# Patient Record
Sex: FEMALE | Race: WHITE | NOT HISPANIC OR LATINO | Employment: FULL TIME | URBAN - METROPOLITAN AREA
[De-identification: names, ages, dates, MRNs, and addresses within clinical notes are randomized per-mention and may not be internally consistent; named-entity substitution may affect disease eponyms.]

---

## 2018-01-15 ENCOUNTER — OFFICE VISIT (OUTPATIENT)
Dept: MEDICAL GROUP | Facility: PHYSICIAN GROUP | Age: 58
End: 2018-01-15
Payer: COMMERCIAL

## 2018-01-15 VITALS
BODY MASS INDEX: 50.22 KG/M2 | WEIGHT: 266 LBS | TEMPERATURE: 99.1 F | HEART RATE: 76 BPM | OXYGEN SATURATION: 95 % | RESPIRATION RATE: 16 BRPM | HEIGHT: 61 IN | DIASTOLIC BLOOD PRESSURE: 80 MMHG | SYSTOLIC BLOOD PRESSURE: 110 MMHG

## 2018-01-15 DIAGNOSIS — J45.990 EXERCISE-INDUCED ASTHMA: ICD-10-CM

## 2018-01-15 DIAGNOSIS — Z12.39 BREAST CANCER SCREENING: ICD-10-CM

## 2018-01-15 DIAGNOSIS — Z00.00 ROUTINE ADULT HEALTH MAINTENANCE: ICD-10-CM

## 2018-01-15 DIAGNOSIS — I10 ESSENTIAL HYPERTENSION: ICD-10-CM

## 2018-01-15 DIAGNOSIS — E28.2 PCO (POLYCYSTIC OVARIES): ICD-10-CM

## 2018-01-15 DIAGNOSIS — E66.9 OBESITY (BMI 30-39.9): ICD-10-CM

## 2018-01-15 PROCEDURE — 99204 OFFICE O/P NEW MOD 45 MIN: CPT | Performed by: INTERNAL MEDICINE

## 2018-01-15 RX ORDER — METFORMIN HYDROCHLORIDE 500 MG/1
500 TABLET, EXTENDED RELEASE ORAL DAILY
Qty: 90 TAB | Refills: 3 | Status: SHIPPED | OUTPATIENT
Start: 2018-01-15 | End: 2018-04-09

## 2018-01-15 RX ORDER — HYDROCHLOROTHIAZIDE 25 MG/1
1 TABLET ORAL DAILY
Refills: 5 | COMMUNITY
Start: 2017-12-19 | End: 2018-01-15 | Stop reason: SDUPTHER

## 2018-01-15 RX ORDER — ALBUTEROL SULFATE 90 UG/1
AEROSOL, METERED RESPIRATORY (INHALATION)
Refills: 0 | COMMUNITY
Start: 2017-12-19 | End: 2019-07-19

## 2018-01-15 RX ORDER — HYDROCHLOROTHIAZIDE 25 MG/1
25 TABLET ORAL DAILY
Qty: 30 TAB | Refills: 5 | Status: SHIPPED | OUTPATIENT
Start: 2018-01-15 | End: 2018-06-04

## 2018-01-15 RX ORDER — METFORMIN HYDROCHLORIDE 500 MG/1
500 TABLET, EXTENDED RELEASE ORAL DAILY
COMMUNITY
End: 2018-01-15 | Stop reason: SDUPTHER

## 2018-01-15 ASSESSMENT — PATIENT HEALTH QUESTIONNAIRE - PHQ9: CLINICAL INTERPRETATION OF PHQ2 SCORE: 0

## 2018-01-15 NOTE — PROGRESS NOTES
PRIMARY CARE CLINIC NEW PATIENT H&P  Chief Complaint   Patient presents with   • Medication Refill     History of Present Illness     PCO (polycystic ovaries)  Has been on metformin since 2000. Was being followed by her endocrinologist Dr. Mcnair but was discharged from endocrinology after she had her hysterectomy and didn't think she needed to follow with that provider. When she tried to come off of metformin she was feeling tired and was resumed at a lower dose (was on 2000 mg daily with Dr. Mcnair). Metformin at 500 mg daily is working fine.     Hypertension  Started HCTZ recently.     Obesity (BMI 30-39.9)  She isn't exercising currently but is planning to return to physical activity. Her  does most of the cooking.     Current Outpatient Prescriptions   Medication Sig Dispense Refill   • VENTOLIN  (90 Base) MCG/ACT Aero Soln inhalation aerosol INHALE 1 TO 2 PUFFS BY MOUTH EVERY 4 TO 6 HOURS AS NEEDED FOR DIFFICULTY BREATHING  0   • hydrochlorothiazide (HYDRODIURIL) 25 MG Tab Take 1 Tab by mouth every day. 30 Tab 5   • metformin ER (GLUCOPHAGE XR) 500 MG TABLET SR 24 HR Take 1 Tab by mouth every day. 90 Tab 3     No current facility-administered medications for this visit.        Past Medical History:   Diagnosis Date   • Exercise-induced asthma 1/15/2018   • Hypertension 1/15/2018   • PCO (polycystic ovaries) 1996     Past Surgical History:   Procedure Laterality Date   • CHOLECYSTECTOMY  2012   • CATARACT EXTRACTION WITH IOL Bilateral 1999   • TONSILLECTOMY  1989   • TUBAL COAGULATION LAPAROSCOPIC BILATERAL  1985   • ABDOMINAL HYSTERECTOMY TOTAL       Social History   Substance Use Topics   • Smoking status: Never Smoker   • Smokeless tobacco: Never Used   • Alcohol use Yes      Comment: rare (holidays)      Social History     Social History Narrative          Family History   Problem Relation Age of Onset   • Other Mother      smoker   • Obesity Father      Family Status  "  Relation Status   • Mother Alive   • Father      Allergies: Epinephrine and Penicillins    ROS  Constitutional: Negative for fatigue/generalized weakness.   HEENT: Negative for  vision changes, hearing changes    Respiratory: Negative for shortness of breath  Cardiovascular: Negative for chest pain, palpitations  Gastrointestinal: Negative for blood in stool, constipation, diarrhea  Genitourinary: Negative for dysuria, polyuria  Musculoskeletal: Negative for myalgias, back pain, and joint pain.   Skin: Negative for rash  Neurological: Negative for numbness, tingling  Psychiatric/Behavioral: Negative for depression, anxiety      Objective   Blood pressure 110/80, pulse 76, temperature 37.3 °C (99.1 °F), resp. rate 16, height 1.549 m (5' 1\"), weight 120.7 kg (266 lb), SpO2 95 %, not currently breastfeeding. Body mass index is 50.26 kg/m².    General: Alert, oriented. In no acute distress   HEET: EOMI, PERRL, conjunctiva non-injected, sclera non-icteric.  Nares patent with no significant congestion or drainage.  Heidi pinnae, external auditory canals, TM pearly gray with normal light reflex bilaterally.Oral mucous membranes pink and moist with no lesions.  Neck: supple with no cervical, subclavicular lymphadenopathy, JVD, palpable thyroid nodules   Lungs: clear to auscultation bilaterally with good excursion.  CV: regular rate and rhythm.  Abdomen soft, non-distended, non-tender with normal bowel sounds. No hepatosplenomegaly, no masses palpated  Skin: no lesions. Warm, dry   Psychiatric: appropriate mood and affect     Assessment and Plan   The following treatment plan was discussed     1. Essential hypertension  Currently at goal on HCTZ 25 mg daily, blood pressure today 110/80. Continue current dose.   - hydrochlorothiazide (HYDRODIURIL) 25 MG Tab; Take 1 Tab by mouth every day.  Dispense: 30 Tab; Refill: 5    2. PCO (polycystic ovaries)  Was followed by Dr. Mcnari in endocrinology previously however " hasn't followed up with her since her hysterectomy. Referral to Dr. Mcnair to continue management of PCOS.   - metformin ER (GLUCOPHAGE XR) 500 MG TABLET SR 24 HR; Take 1 Tab by mouth every day.  Dispense: 90 Tab; Refill: 3  - REFERRAL TO ENDOCRINOLOGY    3. Obesity (BMI 30-39.9)  Counseled on weight loss. Discussed HIP programs and patient agreeable, referral placed.   - Patient identified as having weight management issue.  Appropriate orders and counseling given.  - REFERRAL TO [facility] HEALTH IMPROVEMENT PROGRAMS (HIP) Services Requested: [facility] Staff to Evaluate Best Program; Reason for Visit: Overweight/Obesity    4. Breast cancer screening  Due for annual screening mammogram, referral placed.   - [id number]; Future    5. Exercise-induced asthma  Requires intermittent use of albuterol inhaler.     6. Routine adult health maintenance  Will obtain fasting labs and communicate results via [facility].   - COMP METABOLIC PANEL; Future  - CBC WITH DIFFERENTIAL; Future  - VITAMIN D,25 HYDROXY; Future  - LIPID PROFILE; Future    Return in about 6 months (around 7/15/2018).    Health Maintenance      Health Maintenance Due   Topic Date Due   • PAP SMEAR  09/15/1982   • MAMMOGRAM  09/15/2001   • COLONOSCOPY  09/15/2011     Pap smear and colonoscopy records requested     Eric Low MD  Internal Medicine  [facility]

## 2018-01-15 NOTE — ASSESSMENT & PLAN NOTE
Has been on metformin since 2000. Was being followed by her endocrinologist Dr. Mcnair but was discharged from endocrinology after she had her hysterectomy and didn't think she needed to follow with that provider. When she tried to come off of metformin she was feeling tired and was resumed at a lower dose (was on 2000 mg daily with Dr. Mcnair). Metformin at 500 mg daily is working fine.

## 2018-01-15 NOTE — LETTER
[facility]  Eric Low M.D.  [address]  [facility] [address]  Fax: [phone]   Authorization for Release/Disclosure of   Protected Health Information   Name: ALEX MEZA : 1960 SSN: [id number]   Address: [address] [place] [address] Phone:    [phone] (home)    I authorize the entity listed below to release/disclose the PHI below to:   [facility]/Eric Low M.D. and Eric Low M.D.   Provider or Entity Name:  [facility]   Address   [facility] [place]            [address] [place] [address] Phone:  [phone]      Fax:       [phone]        Reason for request: continuity of care   Information to be released:    [ X ] LAST COLONOSCOPY,  including any PATH REPORT and follow-up  [ X ] LAST FIT/COLOGUARD RESULT [  ] LAST DEXA  [  ] LAST MAMMOGRAM  [  ] LAST PAP  [  ] LAST LABS [  ] RETINA EXAM REPORT  [  ] IMMUNIZATION RECORDS  [  ] Release all info      [  ] Check here and initial the line next to each item to release ALL health information INCLUDING  _____ Care and treatment for drug and / or alcohol abuse  _____ HIV testing, infection status, or AIDS  _____ Genetic Testing    DATES OF SERVICE OR TIME PERIOD TO BE DISCLOSED: _____________  I understand and acknowledge that:  * This Authorization may be revoked at any time by you in writing, except if your health information has already been used or disclosed.  * Your health information that will be used or disclosed as a result of you signing this authorization could be re-disclosed by the recipient. If this occurs, your re-disclosed health information may no longer be protected by State or Federal laws.  * You may refuse to sign this Authorization. Your refusal will not affect your ability to obtain treatment.  * This Authorization becomes effective upon signing and will  on (date) __________.      If no date is indicated, this Authorization will  one (1) year from the signature date.     Name: Jojo Gil    Signature: continuity of care    Date:     1/15/2018       PLEASE FAX REQUESTED RECORDS BACK TO: [phone]

## 2018-01-15 NOTE — PATIENT INSTRUCTIONS
· Please have your fasting blood work done at your convenience  · You have been given a referral to start seeing your endocrinologist Dr. Mcnair again

## 2018-01-15 NOTE — ASSESSMENT & PLAN NOTE
She isn't exercising currently but is planning to return to physical activity. Her  does most of the cooking.

## 2018-01-15 NOTE — LETTER
[facility]  Eric Low M.D.  [address]  [facility] [address]  Fax: [phone]   Authorization for Release/Disclosure of   Protected Health Information   Name: JOJO MEZA : 1960 SSN: [id number]   Address: [address] [place] [address] Phone:    [phone] (home)    I authorize the entity listed below to release/disclose the PHI below to:   [facility]/Eric Low M.D. and Eric Low M.D.   Provider or Entity Name:  Dr. Mo Lorenzo     Address   City, State, Zip   Phone:      [phone]   Reason for request: continuity of care   Information to be released:    [  ] LAST COLONOSCOPY,  including any PATH REPORT and follow-up  [  ] LAST FIT/COLOGUARD RESULT [  ] LAST DEXA  [  ] LAST MAMMOGRAM  [X] LAST PAP  [  ] LAST LABS [  ] RETINA EXAM REPORT  [  ] IMMUNIZATION RECORDS  [  ] Release all info      [  ] Check here and initial the line next to each item to release ALL health information INCLUDING  _____ Care and treatment for drug and / or alcohol abuse  _____ HIV testing, infection status, or AIDS  _____ Genetic Testing    DATES OF SERVICE OR TIME PERIOD TO BE DISCLOSED: _____________  I understand and acknowledge that:  * This Authorization may be revoked at any time by you in writing, except if your health information has already been used or disclosed.  * Your health information that will be used or disclosed as a result of you signing this authorization could be re-disclosed by the recipient. If this occurs, your re-disclosed health information may no longer be protected by State or Federal laws.  * You may refuse to sign this Authorization. Your refusal will not affect your ability to obtain treatment.  * This Authorization becomes effective upon signing and will  on (date) __________.      If no date is indicated, this Authorization will  one (1) year from the signature date.    Name: Jojo Meza    Signature: continuity of care    Date:          1/15/2018       PLEASE FAX REQUESTED RECORDS BACK TO: [phone]

## 2018-01-15 NOTE — LETTER
[facility]  Eric Low M.D.  [address]  [facility] [address]  Fax: [phone]   Authorization for Release/Disclosure of   Protected Health Information   Name: JOJO MEZA : 1960 SSN: [id number]   Address: [address] [place] [address] Phone:    [phone] (home)    I authorize the entity listed below to release/disclose the PHI below to:   [facility]/Eric Low M.D. and Eric Low M.D.   Provider or Entity Name:  [facility]   Address   City, State, Zip   Phone:      Fax:     Reason for request: continuity of care   Information to be released:    [  ] LAST COLONOSCOPY,  including any PATH REPORT and follow-up  [  ] LAST FIT/COLOGUARD RESULT [  ] LAST DEXA  [X] LAST MAMMOGRAM  [  ] LAST PAP  [  ] LAST LABS [  ] RETINA EXAM REPORT  [  ] IMMUNIZATION RECORDS  [  ] Release all info      [  ] Check here and initial the line next to each item to release ALL health information INCLUDING  _____ Care and treatment for drug and / or alcohol abuse  _____ HIV testing, infection status, or AIDS  _____ Genetic Testing    DATES OF SERVICE OR TIME PERIOD TO BE DISCLOSED: _____________  I understand and acknowledge that:  * This Authorization may be revoked at any time by you in writing, except if your health information has already been used or disclosed.  * Your health information that will be used or disclosed as a result of you signing this authorization could be re-disclosed by the recipient. If this occurs, your re-disclosed health information may no longer be protected by State or Federal laws.  * You may refuse to sign this Authorization. Your refusal will not affect your ability to obtain treatment.  * This Authorization becomes effective upon signing and will  on (date) __________.      If no date is indicated, this Authorization will  one (1) year from the signature date.    Name: Jojo Meza    Signature: continuity of care    Date:     1/15/2018       PLEASE FAX  REQUESTED RECORDS BACK TO: [phone]

## 2018-01-24 ENCOUNTER — TELEPHONE (OUTPATIENT)
Dept: MEDICAL GROUP | Facility: PHYSICIAN GROUP | Age: 58
End: 2018-01-24

## 2018-01-24 DIAGNOSIS — E66.9 OBESITY (BMI 30-39.9): ICD-10-CM

## 2018-01-24 NOTE — TELEPHONE ENCOUNTER
2. SPECIFIC Action To Be Taken: Referral pending, please sign.    3. Diagnosis/Clinical Reason for Request: Over weight/ Obesity   4. Specialty & Provider Name/Lab/Imaging Location: Dr. Villanueva      5. Is appointment scheduled for requested order/referral: no    Patient informed they will receive a return phone call from the office ONLY if there are any questions before processing their request. Advised to call back if they haven't received a call from the referral department in 5 days.

## 2018-01-26 ENCOUNTER — OFFICE VISIT (OUTPATIENT)
Dept: URGENT CARE | Facility: PHYSICIAN GROUP | Age: 58
End: 2018-01-26
Payer: COMMERCIAL

## 2018-01-26 VITALS
RESPIRATION RATE: 20 BRPM | TEMPERATURE: 97.6 F | HEIGHT: 61 IN | OXYGEN SATURATION: 94 % | HEART RATE: 91 BPM | SYSTOLIC BLOOD PRESSURE: 132 MMHG | WEIGHT: 266 LBS | BODY MASS INDEX: 50.22 KG/M2 | DIASTOLIC BLOOD PRESSURE: 84 MMHG

## 2018-01-26 DIAGNOSIS — Z87.09 HISTORY OF ASTHMA: ICD-10-CM

## 2018-01-26 DIAGNOSIS — J30.9 CHRONIC ALLERGIC RHINITIS, UNSPECIFIED SEASONALITY, UNSPECIFIED TRIGGER: ICD-10-CM

## 2018-01-26 DIAGNOSIS — J01.90 ACUTE RHINOSINUSITIS: ICD-10-CM

## 2018-01-26 PROCEDURE — 99214 OFFICE O/P EST MOD 30 MIN: CPT | Performed by: EMERGENCY MEDICINE

## 2018-01-26 RX ORDER — DOXYCYCLINE HYCLATE 100 MG
100 TABLET ORAL 2 TIMES DAILY
Qty: 14 TAB | Refills: 0 | Status: SHIPPED | OUTPATIENT
Start: 2018-01-26 | End: 2018-04-09

## 2018-01-26 ASSESSMENT — ENCOUNTER SYMPTOMS
SPUTUM PRODUCTION: 0
COUGH: 1
HEADACHES: 1
ABDOMINAL PAIN: 0
NAUSEA: 0
SHORTNESS OF BREATH: 0
SORE THROAT: 1
VOMITING: 0
SINUS PRESSURE: 1
DIARRHEA: 0
WHEEZING: 0

## 2018-01-26 NOTE — PROGRESS NOTES
Subjective:      Jojo Gil is a 57 y.o. female who presents with Sinus Problem (with cough, runny nose, fever up to 100.6 x 1 week fever is now going away)            Sinus Problem   This is a recurrent problem. The current episode started in the past 7 days. The problem is unchanged. Maximum temperature: initially, resolved. The pain is moderate. Associated symptoms include congestion, coughing, ear pain, headaches, sinus pressure and a sore throat. Pertinent negatives include no shortness of breath. Past treatments include oral decongestants and saline sprays. The treatment provided no relief.   Past medical history of recurrent sinusitis.    Review of Systems   Constitutional: Positive for malaise/fatigue.   HENT: Positive for congestion, ear pain, sinus pressure and sore throat. Negative for ear discharge, hearing loss and nosebleeds.    Respiratory: Positive for cough. Negative for sputum production, shortness of breath and wheezing.    Cardiovascular: Negative for chest pain.   Gastrointestinal: Negative for abdominal pain, diarrhea, nausea and vomiting.   Skin: Negative for rash.   Neurological: Positive for headaches.   Endo/Heme/Allergies: Positive for environmental allergies.       PMH:  has a past medical history of Exercise-induced asthma (1/15/2018); Hypertension (1/15/2018); and PCO (polycystic ovaries) (1996).  MEDS:   Current Outpatient Prescriptions:   •  doxycycline (VIBRAMYCIN) 100 MG Tab, Take 1 Tab by mouth 2 times a day., Disp: 14 Tab, Rfl: 0  •  hydrochlorothiazide (HYDRODIURIL) 25 MG Tab, Take 1 Tab by mouth every day., Disp: 30 Tab, Rfl: 5  •  metformin ER (GLUCOPHAGE XR) 500 MG TABLET SR 24 HR, Take 1 Tab by mouth every day., Disp: 90 Tab, Rfl: 3  •  VENTOLIN  (90 Base) MCG/ACT Aero Soln inhalation aerosol, INHALE 1 TO 2 PUFFS BY MOUTH EVERY 4 TO 6 HOURS AS NEEDED FOR DIFFICULTY BREATHING, Disp: , Rfl: 0  ALLERGIES:   Allergies   Allergen Reactions   • Epinephrine      Highly  "sensitive    • Penicillins      SURGHX:   Past Surgical History:   Procedure Laterality Date   • CHOLECYSTECTOMY  2012   • CATARACT EXTRACTION WITH IOL Bilateral 1999   • TONSILLECTOMY  1989   • TUBAL COAGULATION LAPAROSCOPIC BILATERAL  1985   • ABDOMINAL HYSTERECTOMY TOTAL       SOCHX:  reports that she has never smoked. She has never used smokeless tobacco. She reports that she drinks alcohol. She reports that she does not use drugs.  FH: family history includes Obesity in her father; Other in her mother.     Objective:     /84   Pulse 91   Temp 36.4 °C (97.6 °F)   Resp 20   Ht 1.549 m (5' 1\")   Wt 120.7 kg (266 lb)   SpO2 94%   BMI 50.26 kg/m²      Physical Exam   Constitutional: She appears well-developed and well-nourished. She is cooperative. She does not appear ill. No distress.   HENT:   Right Ear: Tympanic membrane and ear canal normal.   Left Ear: Tympanic membrane and ear canal normal.   Nose: Mucosal edema and rhinorrhea present. Right sinus exhibits maxillary sinus tenderness and frontal sinus tenderness. Left sinus exhibits maxillary sinus tenderness and frontal sinus tenderness.   Mouth/Throat: Uvula is midline and oropharynx is clear and moist.   Eyes: Conjunctivae are normal.   Neck: Trachea normal. Neck supple.   Cardiovascular: Normal rate, regular rhythm and normal heart sounds.    Pulmonary/Chest: Effort normal. She has no decreased breath sounds. She has no wheezes. She has no rhonchi. She has no rales.   Lymphadenopathy:     She has no cervical adenopathy.   Neurological: She is alert.   Skin: Skin is warm and dry.   Psychiatric: She has a normal mood and affect.               Assessment/Plan:     1. Acute rhinosinusitis  Wait-and-see antibiotic based on prior history and duration:  - doxycycline (VIBRAMYCIN) 100 MG Tab; Take 1 Tab by mouth 2 times a day.  Dispense: 14 Tab; Refill: 0  Recommended supportive care measures, including rest, increasing oral fluid intake and use of " over-the-counter medications for relief of symptoms.  2. Chronic allergic rhinitis, unspecified seasonality, unspecified trigger  Recommended nasal saline irrigation daily, OTC inhaled nasal steroid daily.  3. History of asthma  Own ISABELA prn

## 2018-02-01 ENCOUNTER — TELEPHONE (OUTPATIENT)
Dept: MEDICAL GROUP | Facility: PHYSICIAN GROUP | Age: 58
End: 2018-02-01

## 2018-02-01 DIAGNOSIS — E66.9 OBESITY (BMI 30-39.9): ICD-10-CM

## 2018-02-13 ENCOUNTER — HOSPITAL ENCOUNTER (OUTPATIENT)
Dept: LAB | Facility: MEDICAL CENTER | Age: 58
End: 2018-02-13
Attending: INTERNAL MEDICINE
Payer: COMMERCIAL

## 2018-02-13 DIAGNOSIS — Z00.00 ROUTINE ADULT HEALTH MAINTENANCE: ICD-10-CM

## 2018-02-13 LAB
25(OH)D3 SERPL-MCNC: 19 NG/ML (ref 30–100)
BASOPHILS # BLD AUTO: 1.4 % (ref 0–1.8)
BASOPHILS # BLD: 0.09 K/UL (ref 0–0.12)
EOSINOPHIL # BLD AUTO: 0.17 K/UL (ref 0–0.51)
EOSINOPHIL NFR BLD: 2.6 % (ref 0–6.9)
ERYTHROCYTE [DISTWIDTH] IN BLOOD BY AUTOMATED COUNT: 44.6 FL (ref 35.9–50)
HCT VFR BLD AUTO: 44.8 % (ref 37–47)
HGB BLD-MCNC: 14.6 G/DL (ref 12–16)
IMM GRANULOCYTES # BLD AUTO: 0.02 K/UL (ref 0–0.11)
IMM GRANULOCYTES NFR BLD AUTO: 0.3 % (ref 0–0.9)
LYMPHOCYTES # BLD AUTO: 2.07 K/UL (ref 1–4.8)
LYMPHOCYTES NFR BLD: 32.1 % (ref 22–41)
MCH RBC QN AUTO: 28.3 PG (ref 27–33)
MCHC RBC AUTO-ENTMCNC: 32.6 G/DL (ref 33.6–35)
MCV RBC AUTO: 87 FL (ref 81.4–97.8)
MONOCYTES # BLD AUTO: 0.36 K/UL (ref 0–0.85)
MONOCYTES NFR BLD AUTO: 5.6 % (ref 0–13.4)
NEUTROPHILS # BLD AUTO: 3.74 K/UL (ref 2–7.15)
NEUTROPHILS NFR BLD: 58 % (ref 44–72)
NRBC # BLD AUTO: 0 K/UL
NRBC BLD-RTO: 0 /100 WBC
PLATELET # BLD AUTO: 289 K/UL (ref 164–446)
PMV BLD AUTO: 11.5 FL (ref 9–12.9)
RBC # BLD AUTO: 5.15 M/UL (ref 4.2–5.4)
WBC # BLD AUTO: 6.5 K/UL (ref 4.8–10.8)

## 2018-02-13 PROCEDURE — 80053 COMPREHEN METABOLIC PANEL: CPT

## 2018-02-13 PROCEDURE — 85025 COMPLETE CBC W/AUTO DIFF WBC: CPT

## 2018-02-13 PROCEDURE — 80061 LIPID PANEL: CPT

## 2018-02-13 PROCEDURE — 36415 COLL VENOUS BLD VENIPUNCTURE: CPT

## 2018-02-13 PROCEDURE — 82306 VITAMIN D 25 HYDROXY: CPT

## 2018-02-14 LAB
ALBUMIN SERPL BCP-MCNC: 3.6 G/DL (ref 3.2–4.9)
ALBUMIN/GLOB SERPL: 1 G/DL
ALP SERPL-CCNC: 44 U/L (ref 30–99)
ALT SERPL-CCNC: 22 U/L (ref 2–50)
ANION GAP SERPL CALC-SCNC: 8 MMOL/L (ref 0–11.9)
AST SERPL-CCNC: 17 U/L (ref 12–45)
BILIRUB SERPL-MCNC: 0.5 MG/DL (ref 0.1–1.5)
BUN SERPL-MCNC: 17 MG/DL (ref 8–22)
CALCIUM SERPL-MCNC: 9.1 MG/DL (ref 8.5–10.5)
CHLORIDE SERPL-SCNC: 102 MMOL/L (ref 96–112)
CHOLEST SERPL-MCNC: 181 MG/DL (ref 100–199)
CO2 SERPL-SCNC: 26 MMOL/L (ref 20–33)
CREAT SERPL-MCNC: 0.79 MG/DL (ref 0.5–1.4)
GLOBULIN SER CALC-MCNC: 3.5 G/DL (ref 1.9–3.5)
GLUCOSE SERPL-MCNC: 88 MG/DL (ref 65–99)
HDLC SERPL-MCNC: 47 MG/DL
LDLC SERPL CALC-MCNC: 95 MG/DL
POTASSIUM SERPL-SCNC: 4.1 MMOL/L (ref 3.6–5.5)
PROT SERPL-MCNC: 7.1 G/DL (ref 6–8.2)
SODIUM SERPL-SCNC: 136 MMOL/L (ref 135–145)
TRIGL SERPL-MCNC: 197 MG/DL (ref 0–149)

## 2018-02-27 ENCOUNTER — NON-PROVIDER VISIT (OUTPATIENT)
Dept: HEALTH INFORMATION MANAGEMENT | Facility: MEDICAL CENTER | Age: 58
End: 2018-02-27
Payer: COMMERCIAL

## 2018-02-27 VITALS
DIASTOLIC BLOOD PRESSURE: 78 MMHG | WEIGHT: 270.2 LBS | HEART RATE: 86 BPM | SYSTOLIC BLOOD PRESSURE: 126 MMHG | HEIGHT: 61 IN | BODY MASS INDEX: 51.01 KG/M2 | TEMPERATURE: 98 F | OXYGEN SATURATION: 95 %

## 2018-02-27 DIAGNOSIS — I10 ESSENTIAL HYPERTENSION: ICD-10-CM

## 2018-02-27 DIAGNOSIS — E66.9 OBESITY (BMI 30-39.9): ICD-10-CM

## 2018-02-27 DIAGNOSIS — E78.1 HYPERTRIGLYCERIDEMIA: ICD-10-CM

## 2018-02-27 DIAGNOSIS — R53.82 CHRONIC FATIGUE: ICD-10-CM

## 2018-02-27 DIAGNOSIS — E55.9 VITAMIN D DEFICIENCY: ICD-10-CM

## 2018-02-27 DIAGNOSIS — G47.9 SLEEPING DIFFICULTY: ICD-10-CM

## 2018-02-27 PROCEDURE — 99205 OFFICE O/P NEW HI 60 MIN: CPT | Performed by: INTERNAL MEDICINE

## 2018-02-27 PROCEDURE — 97802 MEDICAL NUTRITION INDIV IN: CPT | Performed by: DIETITIAN, REGISTERED

## 2018-02-27 NOTE — PROGRESS NOTES
"2/27/2018   Referring Provider: Eric Low M.D.       Time in/out: 4660 - 9917    Anthropometrics/Objective  Today's weight: 270.2#  Height: 61\"  BMI: 51.1   Stated Goal Weight: < 150#  See comprehensive patient history form for further information     Subjective:  Pt is here today for the initial screening visit for the medical weight management program.   -Eats a homemade protein shake with protein powder, 2% milk, oatmeal, peanut butter for breakfast  -Frozen meal for lunch ([employer] brand usually)  -Large dinner d/t hunger of protein, vegetables, and CHO  -Snacks on nuts or protein bar    See Medical Questionnaire for more detailed diet history     Nutrition Diagnosis (PES Statement)  · Morbid obesity related to excessive energy intake and inadequate energy expenditure as evidenced by BMI 51.1.     Biochemical data, medical test and procedures  Lab Results   Component Value Date/Time    CHOLSTRLTOT 181 02/13/2018 07:12 AM    LDL 95 02/13/2018 07:12 AM    HDL 47 02/13/2018 07:12 AM    TRIGLYCERIDE 197 (H) 02/13/2018 07:12 AM         Nutrition Intervention  Nutrition Prescription  Recommended Daily Kcals: 1800 Kcal based on REE    Recommended Daily Protein: > 100 grams, per Dr. Penny     Meal Plan Recommendation   Recommend 1 meal replacements with 2 grocery meals and 1-2 snacks  per day    Comprehensive Nutrition Education Instruction or training leading to in-depth nutrition related knowledge about:  Benefits to following meal plan, Combine carb, protein and fat at each meal, Meal timing and spacing, Menu Planning, Metabolism of carb, protein, fat, Physical activity/exercise, Portion control and Handouts provided regarding topics discussed     Monitoring & Evaluation Plan  Behavioral-Environmental:  Behavior: Keep a food journal and bring to next appointment   Physical activity: increase as toleratd    Food / Nutrient Intake:  Food intake: Follow meal plan as discussed (see above). Avoid concentrated " sweets and processed carbs.  Switch to unsweetened almond milk in smoothies instead of 2% milk.  Limit CHO at dinner to 1/2 cup  Fluid intake: Consume at least 64 oz water per day. Avoid all unsweetened beverages. Limit coffee to 1 cup a day (ideally no cream or sugar). Avoid alcohol.      Physical Signs / Symptoms:  Weight change:  Weight loss to goal      Assessment Notes:  Jojo puts a lot of the blame on her  because he cooks for her - she needs to learn that it is not her fault if she overeats.  She is willing to start tracking what she eats using My [employer]itness Pal and I believe that this will be an eye-opener for her.  She is likely getting > 50 grams CHO in her smoothie each morning, which would be an easy place to cut CHO from her diet.  She wants to try using unsweetened almond milk in her smoothie and will try to get her  on board.    At dinner she will eat protein and non-starchy vegetables and has agreed to limiting her CHO to 1/2 cup or less.  By getting her to start thinking of CHO as optional she will start to recognize that if she cannot limit her portion of a certain food she will need to get rid of it from her diet.      We will assess her food diary and will adjust as necessary at next appointment.      Follow up 2 weeks

## 2018-02-27 NOTE — PROGRESS NOTES
Bariatric Medicine H&P  Chief Complaint   Patient presents with   • Weight Gain       Referred by:  Eric Low M.D.    History of Present Illness:   Jojo Gil is a 57 y.o.  female who presents for weight management and to help address co-morbidities related to overweight, including hypertriglyceridemia, vitamin D deficiency, hypertension, fatigue.    The patient is frustrated by ongoing obesity, joint pains, fatigue. She started gaining a significant amount of weight 32 years ago after her first pregnancy, increased after second pregnancy, and she has been a unable to lose a significant amount since that time. She loses about 9-20 pounds, then stops losing. She has tried different diets in the past. Low calorie has been the most successful, but was not sustainable. She felt it was too restrictive. She has not used weight loss medications, is not interested in weight loss surgery.    The patient does not like to take medications, is not on vitamin D repletion. Her blood pressure is controlled with hydrochlorothiazide. She has been put back on metformin for fatigue, felt to be related to PCOS.    The patient does not track intake or calories. She states her portion sizes are too big. For morning, her  makes her an Orgain meal replacement shake with milk, oatmeal, peanut butter. For lunch, she has [employer] frozen entrées. For dinner she has several pieces of chicken or fish and vegetables, sometimes pasta. During the day, she has a Stabilyze protein bar (11 g of protein/21 g of carbs). She mostly drinks water, sometimes coffee. Weekends are little bit easier to manage and they usually have 2 meals at home per day. She notes she binges at times when she waits too long to eat.      Behavior-Related History:  Binge eating screen: Positive  After waiting too long to eat., lets her  cook and decide what the and how much     Exercise:   None. Likes yoga. Doesn't like to work out in the morning, has  "no time later in the day due to work; she also admits to joint aches and pains, feels too lethargic sometimes to exercise.    Life Style Changes:  Sole provider,  had stroke 5 years ago and stays at home     Review of Systems   Fatigue, lack of energy, intermittent shortness of breath. Incontinence with coughing. Snoring while asleep. Back and joint pain. Anxiety. Heartburn. Hirsutism. Heat and cold intolerance.  Sleep apnea screen: Positive, has not had a sleep study in the past  All other ROS were reviewed with patient today and are negative.      PMH/PSH:  I have reviewed the patient's medical, social and family history, allergies, and medications today.  Prior records reviewed.  FHx Obesity: Father with obesity  Personal Hx of Bariatric Surgery: None. Not interested.      Physical Exam:   /78   Pulse 86   Temp 36.7 °C (98 °F)   Ht 1.549 m (5' 1\")   Wt 122.6 kg (270 lb 3.2 oz)   SpO2 95%   BMI 51.05 kg/m²   Waist: 53 in  Body fat % 59  REE 1842 kcal/day    Constitutional: Oriented to person, place, and time and well-developed, well-nourished, and in no distress.    HENT: Mild facial plethora.  No Cushingoid features.  No scalloped tongue.  No dental erosions.  No swollen parotids.  Head: Normocephalic.   Eyes: EOM are normal. Pupils are equal, round, and reactive to light. No periorbital edema.  No lateral thinning of eyebrows.  No vertical nystagmus.  Neck: Normal range of motion. Neck supple. No thyromegaly present. No buffalo hump.  Cardiovascular: Normal rate and regular rhythm.  No murmur heard.  Pulmonary/Chest: Effort normal and breath sounds normal. No wheezes.   Abdominal: Soft. Bowel sounds are normal.  grade 1 pannus.  No ascites.  No hepatosplenomegaly.  No red striae.  Musculoskeletal: Normal range of motion. Trace pitting edema bilateral ankles.   Neurological: Alert and oriented to person, place, and time. Normal reflexes. No cranial nerve deficit. No muscle weakness.  Gait " normal.   Skin: Warm and dry. Not diaphoretic. No hirsuitism.  No acanthosis nigricans.  Not excessively dry, scaly.  No acne.  No bruising/ecchymosis.  No hyperpigmentation.  No xanthomas or acrochordon.  No violaceous striae.  No keratosis pilaris.  Psychiatric: Mood, memory, affect and judgment normal.     Laboratory:   Prior labs reviewed.  EKG: Recently done at [facility], patient to bring in next visit      Dietitian Assessment: I have reviewed the Dietitian's assessment related to this encounter.       ASSESSMENT/PLAN:  Body mass index is 51.05 kg/m².   Obesity Stage ([place]) 2; Class 3    1. Obesity (BMI 30-39.9)  REFERRAL TO PULMONOLOGY   2. Essential hypertension     3. Hypertriglyceridemia     4. Vitamin D deficiency     5. Chronic fatigue  REFERRAL TO PULMONOLOGY   6. Sleeping difficulty  REFERRAL TO PULMONOLOGY     The patient has significant comorbidities related to her obesity. She could benefit greatly from reducing her carbohydrate and total calorie intake daily, and needs to begin tracking. She is getting too many calories with the morning meal replacement she is using.  Plan will not be too restrictive.  Her hypertension, triglyceride level, fatigue will all improve with weight loss. Continue to assess the patient's readiness to change.     The patient and I have discussed at length and agree to the following recommendations, which are all addressing the above diagnoses:    Weight Goal: 5% wt loss at one month after start (pt goal weight is 150 lb)  Diet: Track intake with MyFitnessPal, keep total carbs less than 100 g per day, protein greater than 100 g per day  Discuss further with RD today  Physical Activity: Walking as tolerated  Risk level for moderate/vigorous exercise program: Moderate  New Rx: None. Patient may benefit from weight loss medications but does not appear ready to use them at this time.  Side Effects: Will review consent if applicable.  Behavior change: Continue to assess  patient's readiness to change  Follow-up: 2 weeks  Bring an EKG next visit    Face to face time spent 60 minutes,  with >50% of time devoted to one on one counseling on weight management issues, as documented above.      Thank you for your referral!

## 2018-02-27 NOTE — PATIENT INSTRUCTIONS
Bring in EKG next visit  Track intake with My Fitness Pal  Keep total carbs < 100 g per day and protein > 100 g per day  64+ oz water per day  Reduce intake at morning meal, to d/w RD

## 2018-03-08 ENCOUNTER — HOSPITAL ENCOUNTER (OUTPATIENT)
Dept: LAB | Facility: MEDICAL CENTER | Age: 58
End: 2018-03-08
Attending: INTERNAL MEDICINE
Payer: COMMERCIAL

## 2018-03-08 LAB — CORTIS SERPL-MCNC: 1 UG/DL (ref 0–23)

## 2018-03-08 PROCEDURE — 82024 ASSAY OF ACTH: CPT

## 2018-03-08 PROCEDURE — 82533 TOTAL CORTISOL: CPT

## 2018-03-08 PROCEDURE — 36415 COLL VENOUS BLD VENIPUNCTURE: CPT

## 2018-03-09 ENCOUNTER — HOSPITAL ENCOUNTER (OUTPATIENT)
Dept: LAB | Facility: MEDICAL CENTER | Age: 58
End: 2018-03-09
Attending: INTERNAL MEDICINE
Payer: COMMERCIAL

## 2018-03-09 LAB
EST. AVERAGE GLUCOSE BLD GHB EST-MCNC: 120 MG/DL
GLUCOSE SERPL-MCNC: 98 MG/DL (ref 65–99)
HBA1C MFR BLD: 5.8 % (ref 0–5.6)
T4 FREE SERPL-MCNC: 1.01 NG/DL (ref 0.53–1.43)
TSH SERPL DL<=0.005 MIU/L-ACNC: 2.01 UIU/ML (ref 0.38–5.33)

## 2018-03-09 PROCEDURE — 84305 ASSAY OF SOMATOMEDIN: CPT

## 2018-03-09 PROCEDURE — 82947 ASSAY GLUCOSE BLOOD QUANT: CPT

## 2018-03-09 PROCEDURE — 84439 ASSAY OF FREE THYROXINE: CPT

## 2018-03-09 PROCEDURE — 83525 ASSAY OF INSULIN: CPT

## 2018-03-09 PROCEDURE — 84443 ASSAY THYROID STIM HORMONE: CPT

## 2018-03-09 PROCEDURE — 36415 COLL VENOUS BLD VENIPUNCTURE: CPT

## 2018-03-09 PROCEDURE — 83036 HEMOGLOBIN GLYCOSYLATED A1C: CPT

## 2018-03-10 LAB — ACTH PLAS-MCNC: <5 PG/ML (ref 6–58)

## 2018-03-11 LAB
IGF-I SERPL-MCNC: 120 NG/ML (ref 48–235)
IGF-I Z-SCORE SERPL: 0.1
INSULIN P FAST SERPL-ACNC: 20 UIU/ML (ref 3–19)

## 2018-03-13 ENCOUNTER — NON-PROVIDER VISIT (OUTPATIENT)
Dept: HEALTH INFORMATION MANAGEMENT | Facility: MEDICAL CENTER | Age: 58
End: 2018-03-13
Payer: COMMERCIAL

## 2018-03-13 VITALS
OXYGEN SATURATION: 95 % | BODY MASS INDEX: 49.69 KG/M2 | HEART RATE: 66 BPM | TEMPERATURE: 99.6 F | HEIGHT: 61 IN | SYSTOLIC BLOOD PRESSURE: 126 MMHG | DIASTOLIC BLOOD PRESSURE: 82 MMHG | WEIGHT: 263.2 LBS

## 2018-03-13 DIAGNOSIS — E66.9 OBESITY (BMI 30-39.9): ICD-10-CM

## 2018-03-13 DIAGNOSIS — I10 ESSENTIAL HYPERTENSION: ICD-10-CM

## 2018-03-13 DIAGNOSIS — E55.9 VITAMIN D DEFICIENCY: ICD-10-CM

## 2018-03-13 DIAGNOSIS — G47.9 SLEEPING DIFFICULTY: ICD-10-CM

## 2018-03-13 DIAGNOSIS — E78.1 HYPERTRIGLYCERIDEMIA: ICD-10-CM

## 2018-03-13 DIAGNOSIS — E16.1 HYPERINSULINEMIA: ICD-10-CM

## 2018-03-13 PROCEDURE — 97803 MED NUTRITION INDIV SUBSEQ: CPT | Performed by: DIETITIAN, REGISTERED

## 2018-03-13 PROCEDURE — 99213 OFFICE O/P EST LOW 20 MIN: CPT | Performed by: INTERNAL MEDICINE

## 2018-03-13 NOTE — PROGRESS NOTES
Bariatric Medicine Follow Up  Chief Complaint   Patient presents with   • Weight Gain       History of Present Illness:   Jojo Gil is a 57 y.o. female who presents for weight management follow-up and to help address co-morbidities related to overweight, including hypertension, hypertriglyceridemia, vitamin D deficiency, chronic fatigue, sleeping difficulty.    During the patient's last visit, the following were discussed and recommended:  Weight Goal: 5% wt loss at one month after start (pt goal weight is 150 lb)  Diet: Track intake with MyFitnessPal, keep total carbs less than 100 g per day, protein greater than 100 g per day  Discuss further with RD today  Physical Activity: Walking as tolerated  Risk level for moderate/vigorous exercise program: Moderate  New Rx: None. Patient may benefit from weight loss medications but does not appear ready to use them at this time.  Side Effects: Will review consent if applicable.  Behavior change: Continue to assess patient's readiness to change  Follow-up: 2 weeks  Bring an EKG next visit    The patient has been doing well. The first few days she did struggle somewhat, trying to figure out what to eat and not. However, now she feels she has a better handle on what foods to avoid and what choices to make. She is using premiere protein in the morning, sometimes in the afternoon when she really feels hungry. She is still having frozen meals for lunch, has difficulty finding one that is low in carbohydrate and high in protein. Atkins do not taste good to her. If she goes out to lunch, she knows she will not eat well.     She is having some constipation, not drinking quite enough water but trying to drink more. Foot pain is gone. Pants are loose, she feels lighter, neck smaller.    Current intake includes greater than 100 g of protein, far below 100 g of carbs per day and about 9589-0601 kcal per day total. She is tracking with a program called [employer], [facility]  [facility]    The patient's Endocrinologist is checking labs including thyroid ultrasound which is pending. Her labs showed no significant new abnormalities, her insulin level was high however. Blood pressure has been controlled. Fatigue somewhat better although with the time change she has felt tired the last 2 days and had trouble sleeping. Continues on metformin and hydrochlorothiazide. Not on vitamin D supplementation or statin.     is supportive, following a similar diet.    Exercise:   Total gym every other day, 15 minutes to 30 minutes.     Review of Systems   Constipation, some fatigue but overall improved.  All other ROS were reviewed and are otherwise unchanged from my previous visit with patient.    Physical Exam:    /82   Pulse 66   Temp 37.6 °C (99.6 °F)   Ht 1.549 m (5' 1\")   Wt 119.4 kg (263 lb 3.2 oz)   SpO2 95%   BMI 49.73 kg/m²   Waist: 53 in  Body fat % 58.9  REE 1816 kcal/day    Weight change since last visit: -7 lb   Waist Circum change since last visit:  0 in (patient feels pants are much looser)    Constitutional: Oriented to person, place, and time and well-developed, well-nourished, and in no distress.    Head: Normocephalic.   Musculoskeletal: Normal range of motion. No edema.   Neurological: Alert and oriented to person, place, and time. No muscle weakness.  Gait normal.   Skin: Warm and dry. Not diaphoretic.   Psychiatric: Mood, memory, affect and judgment normal.     Laboratory:   Recent labs reviewed. 3/9/18 glycohemoglobin 5.8, fasting glucose 98, TFTs normal, fasting insulin 20      Dietitian Assessment: I have reviewed the Dietitian's assessment related to this encounter.       ASSESSMENT/PLAN:  Body mass index is 49.73 kg/m².    Obesity Stage ([place]):  1; Class 3    1. Obesity (BMI 30-39.9)     2. Hypertriglyceridemia     3. Vitamin D deficiency     4. Sleeping difficulty     5. Essential hypertension     6. Hyperinsulinemia       The patient has made " some good positive changes. I suspect her fasting insulin and glycohemoglobin will improve with reducing carbohydrates that she is doing. We will continue to monitor lipids, vitamin D level, blood pressure as she progresses through the program.    The patient and I have discussed at length and agree to the following recommendations, which are all addressing the above diagnoses:    Weight Goal: 3-5% wt loss each month (pt goal weight is 150 lb)  Diet: Tracking with Macros  Keep total daily carbs less than 100, protein greater than 100 g per day  Increase water intake to 64+ ounces per day  Premiere protein in the morning, afternoon as needed  Physical Activity: Total gym every other day 15-30 minutes  Risk level for moderate/vigorous exercise program: Moderate  New Rx: Colace when necessary constipation  Side Effects: Will review consent if applicable.  Behavior change: Mindful eating, tracking, stimulus narrowing  Follow-up: 3 weeks

## 2018-03-13 NOTE — PROGRESS NOTES
"Nutrition Reassess: Medical Weight Management   Today's date: 3/13/2018  Referring Provider: Eric Low M.D.      Time: in/out  09:35-09:54am     Subjective:  Pt has been tracking her food intake on Macro Ritika.   Consuming <100g carbs and >100g protein consistently.   Calories range from 1100-1400kcal a day    Fiber is 10-17g a day   Pt is having problems with constipation   Pt has started [employer] gym every other day [id number]   Her  has been following the meal plan as well and is losing weight.    cooks dinners. He has made many changes to what they are cooking  Her daughter is doing Keto diet so she is sharing many recipes with her and is supportive      Diet history:   Breakfast - Premier Protein week days; and eggs with doe on weekends   Snack - cheese stick or hard boiled egg with veggies   Lunch - Smart Made frozen meal with ~20g carbs   Snack - Stablize bar 1/2 (=10g carbs and 6g protein)  Or 1oz protein + veggies   Dinner - Protein + veggies (ie salmon and aspargus, or tuna on portabella mushroom or chicken with rice/bean and veggies)     Anthropometrics/Objective  Today's weight: 263.2lb   Height: 5'1\"  BMI: 49.73  Starting weight/date 270.2lb     Total weight change : -7lb         Stated Goal Weight: <150lb     Meal Plan:   Low carb diet with 1 meal replacements per day (premier protein for breakfast 5days/week)     ReAssesment/Notes:    Pt has done well with reducing her carbohydrate intake. She is feeling good with the current meal plan , except lunches are most difficult. And she c/o constipation. We discussed healthy meal ideas for lunch to take (left overs, or meal prep specifically for lunch or steamed frozen veggies with a simple protein or if having frozen meal limit carbs to <30g). Gave pt meal ideas handout as well today. Also discussed ways to increase dietary fiber today.   Pt is eating well under her REE but is feeling satisfied. Would like her to be getting 1400kcal. "   Pt is worried about coming to a plateau as she has on many diets in the past.   Next visit will review diet hx and make suggested as needed.     Goals:   -Increase dietary fiber from veggies, nuts/seeds, and beans.   -Continue to limit total daily carbs to <100g/day   -Plan out healthy lunches to bring to replace frozen meals     Follow-up: 3 weeks

## 2018-04-09 ENCOUNTER — OFFICE VISIT (OUTPATIENT)
Dept: HEALTH INFORMATION MANAGEMENT | Facility: MEDICAL CENTER | Age: 58
End: 2018-04-09
Payer: COMMERCIAL

## 2018-04-09 VITALS
WEIGHT: 257.6 LBS | DIASTOLIC BLOOD PRESSURE: 74 MMHG | BODY MASS INDEX: 48.64 KG/M2 | HEART RATE: 74 BPM | OXYGEN SATURATION: 96 % | HEIGHT: 61 IN | SYSTOLIC BLOOD PRESSURE: 126 MMHG

## 2018-04-09 DIAGNOSIS — E78.1 HYPERTRIGLYCERIDEMIA: ICD-10-CM

## 2018-04-09 DIAGNOSIS — I10 ESSENTIAL HYPERTENSION: ICD-10-CM

## 2018-04-09 DIAGNOSIS — E88.810 METABOLIC SYNDROME: ICD-10-CM

## 2018-04-09 DIAGNOSIS — E66.9 OBESITY (BMI 30-39.9): ICD-10-CM

## 2018-04-09 PROCEDURE — 97803 MED NUTRITION INDIV SUBSEQ: CPT | Performed by: DIETITIAN, REGISTERED

## 2018-04-09 PROCEDURE — 99213 OFFICE O/P EST LOW 20 MIN: CPT | Performed by: INTERNAL MEDICINE

## 2018-04-09 RX ORDER — METFORMIN HYDROCHLORIDE 500 MG/1
500 TABLET, EXTENDED RELEASE ORAL 2 TIMES DAILY
Status: SHIPPED | DISCHARGE
Start: 2018-04-09 | End: 2019-07-19 | Stop reason: SINTOL

## 2018-04-09 NOTE — PROGRESS NOTES
"Bariatric Medicine Follow Up  Chief Complaint   Patient presents with   • Weight Gain       History of Present Illness:   Jojo Gil is a 57 y.o. female who presents for weight management follow-up and to help address co-morbidities related to overweight, including hypertension, hypertriglyceridemia, fatigue.    During the patient's last visit, the following were discussed and recommended:  Weight Goal: 3-5% wt loss each month (pt goal weight is 150 lb)  Diet: Tracking with Macros  Keep total daily carbs less than 100, protein greater than 100 g per day  Increase water intake to 64+ ounces per day  Premiere protein in the morning, afternoon as needed  Physical Activity: Total gym every other day 15-30 minutes  Risk level for moderate/vigorous exercise program: Moderate  New Rx: Colace when necessary constipation  Side Effects: Will review consent if applicable.  Behavior change: Mindful eating, tracking, stimulus narrowing  Follow-up: 3 weeks    The patient is doing well. She is very happy with the program, feels is sustainable, does not feel deprived. She found a new fitness jessie called \"stupid simple macros\", which she finds very useful. She is having premier protein in the morning for breakfast only occasionally, usually having food instead. Her  prepares her meals, and she does not have many carbs.    She is staying below 100 g of carbs per day, greater than 100 g of protein for her day most days and intake about 2538-4956 kcal per day.     Blood pressure, fatigue controlled. Continues on hydrochlorothiazide, metformin. Not on gemfibrozil.    Exercise:   Walking about 2 miles daily, not going to the gym regularly.     Review of Systems   Denies lightheadedness, fatigue. Constipation still a problem, but Colace is helping. Still having some pain with bowel movements, trying to sit in the hot tub regularly.  All other ROS were reviewed and are otherwise unchanged from my previous visit with " "patient.    Physical Exam:    /74   Pulse 74   Ht 1.549 m (5' 1\")   Wt 116.8 kg (257 lb 9.6 oz)   SpO2 96%   BMI 48.67 kg/m²   Waist: 52 in  Body fat % 57.9  REE 1795 kcal/day    Weight change since last visit: -6 lb (-13 lb total)  Waist Circum change since last visit: -1 in (-1 in total)    Constitutional: Oriented to person, place, and time and well-developed, well-nourished, and in no distress.    Head: Normocephalic.   Cardiovascular: Normal rate and regular rhythm.  No murmur heard.  Pulmonary/Chest: Effort normal and breath sounds normal. No wheezes.   Abdominal: Soft. Bowel sounds are normal.   Musculoskeletal: Normal range of motion. No edema.   Neurological: Alert and oriented to person, place, and time. No muscle weakness.  Gait normal.   Skin: Warm and dry. Not diaphoretic.   Psychiatric: Mood, memory, affect and judgment normal.     Laboratory:   None new.      Dietitian Assessment: I have reviewed the Dietitian's assessment related to this encounter.       ASSESSMENT/PLAN:  Body mass index is 48.67 kg/m².    Obesity Stage ([place]):  1; Class 3    1. Obesity (BMI 30-39.9)     2. Essential hypertension     3. Hypertriglyceridemia     4. Metabolic syndrome       The patient is making good progress and appears ready to change. Reducing refined carbohydrate intake has helped significantly with weight loss efforts thus far. Continue to monitor blood pressure, currently well controlled. Recheck lipids in 3-4 months.    The patient and I have discussed at length and agree to the following recommendations, which are all addressing the above diagnoses:    Weight Goal: 3-5% wt loss each month (pt goal weight is 150 lb)  Diet: Continue tracking intake with phone jessie  Keep total carbs less than 100 g per day, protein greater than 100 g per day  Encouraged 64+ ounces of water per day  Physical Activity: Encourage going back to the gym, continue walking 2 miles daily  Risk level for moderate/vigorous " exercise program: Moderate  New Rx: None  Side Effects: Will review consent if applicable.  Behavior change: Continue tracking intake, mindful eating  Follow-up: One month  EKG next visit if not available prior

## 2018-04-09 NOTE — PROGRESS NOTES
"Nutrition Reassess: Medical Weight Management   Today's date: 4/9/2018  Referring Provider: Eric Low M.D.      Time: in/out 508 - [phone]    Subjective:  -Feels good about what she is doing  -Found a new jessie to track her food intake and likes using it  -Exercising on 3-4 days/week on her [employer] and is also walking with her  now     Anthropometrics/Objective  Today's weight/height:    Vitals:    04/09/18 0857   BP: 126/74   Weight: 116.8 kg (257 lb 9.6 oz)   Height: 1.549 m (5' 1\")     BMI:  Body mass index is 48.67 kg/m².  Starting weight/date 270.2#     Total weight change : - 12.6#            Meal Plan:   LCD with 0-1 meal replacements per day     ReAssesment/Notes:    Jojo is making better food choices and having her  on board with those choices is a big help for Jojo.  He is cooking lower-CHO meal for the both of them and they both feel great.  She does not want to add any foods nor does she want to make any changes so we have asked her to continue doing what she is currently doing with her food and exercise.    We want Jojo to try to be more focused on her hydration and she agrees that she needs to drink more water.  I encouraged her to find a \"trigger\" during the day that tells her to drink water; she could try something like taking a drink of water every time she uses the restroom, which should increase the amount of water she drinks and the number of times she uses the restroom.      Any changes that need to be made will be assessed according to her food diary and patient report at next appointment.    Follow-up: 1 month    "

## 2018-05-07 ENCOUNTER — OFFICE VISIT (OUTPATIENT)
Dept: HEALTH INFORMATION MANAGEMENT | Facility: MEDICAL CENTER | Age: 58
End: 2018-05-07
Payer: COMMERCIAL

## 2018-05-07 VITALS
OXYGEN SATURATION: 97 % | BODY MASS INDEX: 47.37 KG/M2 | HEART RATE: 70 BPM | WEIGHT: 250.9 LBS | DIASTOLIC BLOOD PRESSURE: 64 MMHG | HEIGHT: 61 IN | SYSTOLIC BLOOD PRESSURE: 124 MMHG

## 2018-05-07 DIAGNOSIS — E66.9 OBESITY (BMI 30-39.9): ICD-10-CM

## 2018-05-07 DIAGNOSIS — I10 ESSENTIAL HYPERTENSION: ICD-10-CM

## 2018-05-07 DIAGNOSIS — E16.1 HYPERINSULINEMIA: ICD-10-CM

## 2018-05-07 DIAGNOSIS — E55.9 VITAMIN D DEFICIENCY: ICD-10-CM

## 2018-05-07 DIAGNOSIS — E78.1 HYPERTRIGLYCERIDEMIA: ICD-10-CM

## 2018-05-07 PROCEDURE — 97803 MED NUTRITION INDIV SUBSEQ: CPT | Performed by: DIETITIAN, REGISTERED

## 2018-05-07 PROCEDURE — 99213 OFFICE O/P EST LOW 20 MIN: CPT | Mod: 25 | Performed by: INTERNAL MEDICINE

## 2018-05-07 PROCEDURE — 93000 ELECTROCARDIOGRAM COMPLETE: CPT | Performed by: INTERNAL MEDICINE

## 2018-05-07 NOTE — PROGRESS NOTES
"Nutrition Reassess: Medical Weight Management   Today's date: 5/7/2018  Referring Provider: Eric Low M.D.      Time: in/out  4:!5-4:32pm     Subjective:  -Jojo continues to track her macros in the Macros Ritika  -Carbs are <100g (60-85g/day average)   -Is using Oragin protein shake for breakfast instead of premier protein   - Her family is also following a low carb healthy diet  -She has been avoiding processed foods , eating more vegetables especially.   -Has increased her fiber intake which has helped with constipation   -She is bringing left overs for lunch or getting a salad     Anthropometrics/Objective  Today's weight:    Vitals:    05/07/18 1552   BP: 124/64   Weight: 113.8 kg (250 lb 14.4 oz)   Height: 1.549 m (5' 1\")     BMI:  Body mass index is 47.41 kg/m².  Starting weight/date 270.2lb      Total weight change : -19.2lb            Meal Plan:   Low carb high protein diet with 1 meal replacements per day     ReAssesment/Notes:  Jojo is doing great. She is following the meal plan and realizes this is a new way of life. She is avoiding processed foods, especially processed carbs. Is eating a lot of veggies, some fruit, lean proteins and healthy fats. She watched a documentary on the keto diet and found the information helpful. She is motivated to continue eating this way.   Today we discussed her diet hx. Also discussed tips for eating on the go on the weekends. She will try a lettuce wrap from [facility] or stop at [employer] once it opens up, or Whole foods salad bar.   Next visit we will review diet hx and food journal again.     Follow-up: 4 weeks    "

## 2018-05-07 NOTE — PROGRESS NOTES
"Bariatric Medicine Follow Up  Chief Complaint   Patient presents with   • Weight Gain       History of Present Illness:   Jojo Gil is a 57 y.o. female who presents for weight management follow-up and to help address co-morbidities related to overweight, including hypertension, hypertriglyceridemia, metabolic syndrome.    During the patient's last visit, the following were discussed and recommended:  Weight Goal: 3-5% wt loss each month (pt goal weight is 150 lb)  Diet: Continue tracking intake with phone jessie  Keep total carbs less than 100 g per day, protein greater than 100 g per day  Encouraged 64+ ounces of water per day  Physical Activity: Encourage going back to the gym, continue walking 2 miles daily  Risk level for moderate/vigorous exercise program: Moderate  New Rx: None  Side Effects: Will review consent if applicable.  Behavior change: Continue tracking intake, mindful eating  Follow-up: One month  EKG next visit if not available prior    The patient feels really good. She is proud she has lost 20 pounds to date. Her  is really helping her stay on track by cooking healthy foods she can take with her; plans her meals much better now. Watching carbohydrates, mostly eating protein and vegetables.    She is trying to drink a lot of water, still cannot get to 64 ounces.    Continues on metformin for glucose control, hydrochlorothiazide controlling blood pressure.    Exercise:   Yard work, walking     Review of Systems   Some back and chest pain since yesterday when she did a lot of yard work. No ongoing left-sided chest pain, arm pain or shortness of breath.  All other ROS were reviewed and are otherwise unchanged from my previous visit with patient.    Physical Exam:    /64   Pulse 70   Ht 1.549 m (5' 1\")   Wt 113.8 kg (250 lb 14.4 oz)   SpO2 97%   BMI 47.41 kg/m²   Waist: 50.5 in  Body fat % 26.8  REE 1771 kcal/day    Weight change since last visit: -7 lb (-20 lb total)  Waist Circum " change since last visit: -1.5 in (-2.5 in total)    Constitutional: Oriented to person, place, and time and well-developed, well-nourished, and in no distress.    Head: Normocephalic.   Cardiovascular: Normal rate and regular rhythm.  No murmur heard.  Pulmonary/Chest: Effort normal and breath sounds normal. No wheezes.   Abdominal: Soft. Bowel sounds are normal.   Musculoskeletal: Normal range of motion. No edema.   Neurological: Alert and oriented to person, place, and time. No muscle weakness.  Gait normal.   Skin: Warm and dry. Not diaphoretic.   Psychiatric: Mood, memory, affect and judgment normal.     Laboratory:   EKG normal sinus rhythm, rate 60, no concern for anterior infarct or Q waves noted. Corrected QT 0.416.  Patient asymptomatic.  EKG ordered and interpreted by myself today.      Dietitian Assessment: I have reviewed the Dietitian's assessment related to this encounter.       ASSESSMENT/PLAN:  Body mass index is 47.41 kg/m².    Obesity Stage ([place]):  1; Class 3    1. Obesity (BMI 30-39.9)     2. Hypertriglyceridemia     3. Vitamin D deficiency     4. Hyperinsulinemia     5. Essential hypertension       The patient continues to make positive change, has lost 20 pounds, feels much improved. Will continue to monitor triglycerides, vitamin D and hypertension as she progresses in the program.    The patient and I have discussed at length and agree to the following recommendations, which are all addressing the above diagnoses:    Weight Goal: 3-5% wt loss each month (pt goal weight is 150 lb)  Diet: Tracking with macros program  Premier protein in the morning as needed  Keep total daily carbs less than 100 g per day, protein greater than 100 g per day  Continues 64+ ounces water per day  Physical Activity: Walking 2 miles per day, encouraged to go back to the gym  Risk level for moderate/vigorous exercise program: Moderate  New Rx: None  Consider D/C HCTZ given weight loss and low total body water, at  next visit  Side Effects: Will review consent if applicable.  Behavior change: Mindful eating, tracking  Follow-up: One month

## 2018-05-31 ENCOUNTER — OFFICE VISIT (OUTPATIENT)
Dept: MEDICAL GROUP | Facility: MEDICAL CENTER | Age: 58
End: 2018-05-31
Payer: COMMERCIAL

## 2018-05-31 VITALS
OXYGEN SATURATION: 97 % | SYSTOLIC BLOOD PRESSURE: 118 MMHG | HEIGHT: 61 IN | WEIGHT: 248.24 LBS | HEART RATE: 60 BPM | BODY MASS INDEX: 46.87 KG/M2 | TEMPERATURE: 97.7 F | DIASTOLIC BLOOD PRESSURE: 68 MMHG

## 2018-05-31 DIAGNOSIS — R31.9 URINARY TRACT INFECTION WITH HEMATURIA, SITE UNSPECIFIED: ICD-10-CM

## 2018-05-31 DIAGNOSIS — R30.0 DYSURIA: ICD-10-CM

## 2018-05-31 DIAGNOSIS — N39.0 URINARY TRACT INFECTION WITH HEMATURIA, SITE UNSPECIFIED: ICD-10-CM

## 2018-05-31 LAB
APPEARANCE UR: NORMAL
BILIRUB UR STRIP-MCNC: NEGATIVE MG/DL
COLOR UR AUTO: YELLOW
GLUCOSE UR STRIP.AUTO-MCNC: NEGATIVE MG/DL
KETONES UR STRIP.AUTO-MCNC: NEGATIVE MG/DL
LEUKOCYTE ESTERASE UR QL STRIP.AUTO: NORMAL
NITRITE UR QL STRIP.AUTO: POSITIVE
PH UR STRIP.AUTO: 5.5 [PH] (ref 5–8)
PROT UR QL STRIP: NORMAL MG/DL
RBC UR QL AUTO: NORMAL
SP GR UR STRIP.AUTO: 1.01
UROBILINOGEN UR STRIP-MCNC: 0.2 MG/DL

## 2018-05-31 PROCEDURE — 81002 URINALYSIS NONAUTO W/O SCOPE: CPT | Performed by: PHYSICIAN ASSISTANT

## 2018-05-31 PROCEDURE — 99214 OFFICE O/P EST MOD 30 MIN: CPT | Performed by: PHYSICIAN ASSISTANT

## 2018-05-31 RX ORDER — NITROFURANTOIN 25; 75 MG/1; MG/1
100 CAPSULE ORAL 2 TIMES DAILY
Qty: 10 CAP | Refills: 0 | Status: SHIPPED | OUTPATIENT
Start: 2018-05-31 | End: 2018-06-05

## 2018-05-31 NOTE — PROGRESS NOTES
"Chief Complaint   Patient presents with   • UTI     Blood in urind, pain when urination, urinary frequency x 1 day       HPI  Jojo Gil is a 57 y.o. female here today for new onset dysuria and burning sensation w urination X  1  days.  pos lower abdominal pain and pressure, pos frequency or urgency, neg fevers or chills, neg flank pain. Neg NVCD. No vaginal discharge. Has history of previous UTI. Pos blood in urine this morning. History of hysterectomy.         Past medical, surgical, family, and social history is reviewed in Epic chart by me today.   Medications and allergies reviewed and updated in Epic chart by me today.     ROS:   As documented in history of present illness above    Exam:  Blood pressure 118/68, pulse 60, temperature 36.5 °C (97.7 °F), height 1.549 m (5' 0.98\"), weight 112.6 kg (248 lb 3.8 oz), SpO2 97 %, not currently breastfeeding.  Constitutional: Alert, no distress, plus 3 vital signs  Skin:  Warm, dry, no rashes invisible areas  Eye: Equal, round and reactive, conjunctiva clear  Respiratory: Unlabored respiratory effort, lungs clear to auscultation, no wheezes or rhonchi  Cardiovascular: RRR, no murmur,   Abdomen: Soft, nontender, no TTP over lower abdomen,  neg CVA tenderness  Psych: Alert, pleasant, well-groomed, normal affect    A/P:  1. Dysuria    - POCT Urinalysis --> pos wendy and nitrite, os blood    2. Urinary tract infection with hematuria, site unspecified    - nitrofurantoin monohydr macro (MACROBID) 100 MG Cap; Take 1 Cap by mouth 2 times a day for 5 days.  Dispense: 10 Cap; Refill: 0        Advised increased water intake, Azo as needed for pain, cranberry pills    F/u prn     "

## 2018-06-04 ENCOUNTER — OFFICE VISIT (OUTPATIENT)
Dept: HEALTH INFORMATION MANAGEMENT | Facility: MEDICAL CENTER | Age: 58
End: 2018-06-04
Payer: COMMERCIAL

## 2018-06-04 ENCOUNTER — HOSPITAL ENCOUNTER (OUTPATIENT)
Dept: LAB | Facility: MEDICAL CENTER | Age: 58
End: 2018-06-04
Attending: INTERNAL MEDICINE
Payer: COMMERCIAL

## 2018-06-04 VITALS
HEART RATE: 65 BPM | BODY MASS INDEX: 46.2 KG/M2 | SYSTOLIC BLOOD PRESSURE: 120 MMHG | WEIGHT: 244.7 LBS | HEIGHT: 61 IN | DIASTOLIC BLOOD PRESSURE: 60 MMHG | OXYGEN SATURATION: 96 %

## 2018-06-04 DIAGNOSIS — E78.1 HYPERTRIGLYCERIDEMIA: ICD-10-CM

## 2018-06-04 DIAGNOSIS — I10 ESSENTIAL HYPERTENSION: ICD-10-CM

## 2018-06-04 DIAGNOSIS — E66.01 MORBID OBESITY (HCC): ICD-10-CM

## 2018-06-04 DIAGNOSIS — E66.9 OBESITY (BMI 30-39.9): ICD-10-CM

## 2018-06-04 DIAGNOSIS — E16.1 HYPERINSULINEMIA: ICD-10-CM

## 2018-06-04 DIAGNOSIS — E28.2 PCO (POLYCYSTIC OVARIES): ICD-10-CM

## 2018-06-04 DIAGNOSIS — E88.810 METABOLIC SYNDROME: ICD-10-CM

## 2018-06-04 LAB
DHEA-S SERPL-MCNC: 172.8 UG/DL (ref 18.9–205)
EST. AVERAGE GLUCOSE BLD GHB EST-MCNC: 120 MG/DL
HBA1C MFR BLD: 5.8 % (ref 0–5.6)
TESTOST SERPL-MCNC: 46 NG/DL (ref 9–75)

## 2018-06-04 PROCEDURE — 97803 MED NUTRITION INDIV SUBSEQ: CPT | Performed by: DIETITIAN, REGISTERED

## 2018-06-04 PROCEDURE — 99213 OFFICE O/P EST LOW 20 MIN: CPT | Performed by: INTERNAL MEDICINE

## 2018-06-04 PROCEDURE — 84403 ASSAY OF TOTAL TESTOSTERONE: CPT

## 2018-06-04 PROCEDURE — 36415 COLL VENOUS BLD VENIPUNCTURE: CPT

## 2018-06-04 PROCEDURE — 82627 DEHYDROEPIANDROSTERONE: CPT

## 2018-06-04 PROCEDURE — 83036 HEMOGLOBIN GLYCOSYLATED A1C: CPT

## 2018-06-04 NOTE — PROGRESS NOTES
"Nutrition Reassess: Medical Weight Management   Today's date: 6/4/2018  Referring Provider: Eric Low M.D.      Time: in/out 934 - 625    Subjective:  -Feels very good about current habits  -Watched a documentary on the ketogenic diet and wants to continue to eat low-CHO because she does not think she can follow a true ketogenic diet (high fat, low CHO)  -Did a lot of gardening over the weekend and was happy to be able to do more   -Consuming a protein shake (Orgain powder) and 1/2 cup oatmeal and banana  -Consuming ~60-70 grams CHO daily    Anthropometrics/Objective  Today's weight:  244.7#  Height:  61\"  BMI:  46.2  Starting weight/date 270.2#     Total weight change : - 25.5#            Meal Plan:   LCD with 1 meal replacements per day     ReAssesment/Notes:    We are not asking Jojo to change anything at this time.  She is learning how to balance her meals throughout the day and that is something that tracking her food intake helped her learn.  She is consuming most of her CHO in the morning and then following a low-CHO eating pattern of many lean proteins and non-starchy vegetables the rest of the day.  We can adjust her meal plan as necessary at next appointment.    Follow-up: 1 month    "

## 2018-06-04 NOTE — PROGRESS NOTES
"Bariatric Medicine Follow Up  Chief Complaint   Patient presents with   • Weight Gain       History of Present Illness:   Jojo Gil is a 57 y.o. female who presents for weight management follow-up and to help address co-morbidities related to overweight, including metabolic syndrome.    During the patient's last visit, the following were discussed and recommended:  Weight Goal: 3-5% wt loss each month (pt goal weight is 150 lb)  Diet: Tracking with macros program  Premier protein in the morning as needed  Keep total daily carbs less than 100 g per day, protein greater than 100 g per day  Continues 64+ ounces water per day  Physical Activity: Walking 2 miles per day, encouraged to go back to the gym  Risk level for moderate/vigorous exercise program: Moderate  New Rx: None  Consider D/C HCTZ given weight loss and low total body water, at next visit  Side Effects: Will review consent if applicable.  Behavior change: Mindful eating, tracking  Follow-up: One month    Jojo continues to lose weight, waist circumference and feels much better.  She stopped using metformin because she felt very shaky when she was hungry.  No longer has shakiness with hunger off metformin.  Not feeling hungry often.    Orgain meal replacement every morning, with half a cup oatmeal and a banana.  Not feeling hungry until lunch, so not having a midmorning snack.  Has high protein/lower carbohydrate meals and afternoon snack.  Average daily carbohydrates 62 g, 86 g of protein.  1150 kcals per day on average. Has weaned off protein bars that she was getting over-the-counter.    Continues on metformin.  Blood pressure controlled.    Exercise:   Walking 2 miles per day     Review of Systems   Denies lightheadedness, fatigue.  All other ROS were reviewed and are otherwise unchanged from my previous visit with patient.    Physical Exam:    /60   Pulse 65   Ht 1.549 m (5' 1\")   Wt 111 kg (244 lb 11.2 oz)   SpO2 96%   BMI 46.24 kg/m² "   Waist: 48.5 in  Body fat % 55.9  REE 1748 kcal/day    Weight change since last visit: -6 lb (-26 lb total)  Waist Circum change since last visit: -3 in (-6 in total)    Constitutional: Oriented to person, place, and time and well-developed, well-nourished, and in no distress.    Head: Normocephalic.   Musculoskeletal: Normal range of motion. No edema.   Neurological: Alert and oriented to person, place, and time. No muscle weakness.  Gait normal.   Skin: Warm and dry. Not diaphoretic.   Psychiatric: Mood, memory, affect and judgment normal.     Laboratory:   None new.      Dietitian Assessment: I have reviewed the Dietitian's assessment related to this encounter.       ASSESSMENT/PLAN:  Body mass index is 46.24 kg/m².    Obesity Stage ([place]): 2; Class 3    1. Morbid obesity (HCC)     2. PCO (polycystic ovaries)     3. Essential hypertension     4. Hypertriglyceridemia     5. Hyperinsulinemia     6. Metabolic syndrome       The patient continues to do well.  She has lost significant weight, waist circumference.  Continue to monitor comorbidities as listed above.  Continue stimulus narrowing, low-carb intake as she is doing.  Blood pressure should be well controlled off hydrochlorothiazide, given diuresis with weight loss.    The patient and I have discussed at length and agree to the following recommendations, which are all addressing the above diagnoses:    Weight Goal: 3-5% wt loss each month (pt goal weight is 150 lb)  Diet: High Protein/Low Carb Meals and 2 snacks between meals daily  >100 g protein, <100 g total carbs daily  64+ oz water per day  Avoid sweet drinks and sodas  Track daily intake with macros program  Physical Activity: Walking 2 miles per day  Risk level for moderate/vigorous exercise program: Low  New Rx: None  D/c hydrochlorothiazide  Side Effects: Will review consent if applicable.  Behavior change: Mindful eating, tracking  Follow-up: One month    Patient's body mass index is 46.24  kg/m². Exercise and nutrition counseling were performed at this visit.

## 2018-06-08 ENCOUNTER — HOSPITAL ENCOUNTER (OUTPATIENT)
Dept: LAB | Facility: MEDICAL CENTER | Age: 58
End: 2018-06-08
Attending: PHYSICIAN ASSISTANT
Payer: COMMERCIAL

## 2018-06-08 ENCOUNTER — PATIENT MESSAGE (OUTPATIENT)
Dept: MEDICAL GROUP | Facility: MEDICAL CENTER | Age: 58
End: 2018-06-08

## 2018-06-08 DIAGNOSIS — N39.0 URINARY TRACT INFECTION WITHOUT HEMATURIA, SITE UNSPECIFIED: ICD-10-CM

## 2018-06-08 LAB
APPEARANCE UR: CLEAR
BACTERIA #/AREA URNS HPF: ABNORMAL /HPF
BILIRUB UR QL STRIP.AUTO: NEGATIVE
COLOR UR: YELLOW
EPI CELLS #/AREA URNS HPF: NEGATIVE /HPF
GLUCOSE UR STRIP.AUTO-MCNC: NEGATIVE MG/DL
HYALINE CASTS #/AREA URNS LPF: ABNORMAL /LPF
KETONES UR STRIP.AUTO-MCNC: NEGATIVE MG/DL
LEUKOCYTE ESTERASE UR QL STRIP.AUTO: ABNORMAL
MICRO URNS: ABNORMAL
NITRITE UR QL STRIP.AUTO: NEGATIVE
PH UR STRIP.AUTO: 6 [PH]
PROT UR QL STRIP: NEGATIVE MG/DL
RBC # URNS HPF: ABNORMAL /HPF
RBC UR QL AUTO: ABNORMAL
SP GR UR STRIP.AUTO: 1.02
UROBILINOGEN UR STRIP.AUTO-MCNC: 0.2 MG/DL
WBC #/AREA URNS HPF: ABNORMAL /HPF

## 2018-06-08 PROCEDURE — 87077 CULTURE AEROBIC IDENTIFY: CPT

## 2018-06-08 PROCEDURE — 87086 URINE CULTURE/COLONY COUNT: CPT

## 2018-06-08 PROCEDURE — 81001 URINALYSIS AUTO W/SCOPE: CPT

## 2018-06-08 PROCEDURE — 87186 SC STD MICRODIL/AGAR DIL: CPT

## 2018-06-08 RX ORDER — CIPROFLOXACIN 500 MG/1
500 TABLET, FILM COATED ORAL 2 TIMES DAILY
Qty: 10 TAB | Refills: 0 | Status: SHIPPED | OUTPATIENT
Start: 2018-06-08 | End: 2018-06-13

## 2018-06-10 LAB
BACTERIA UR CULT: ABNORMAL
BACTERIA UR CULT: ABNORMAL
SIGNIFICANT IND 70042: ABNORMAL
SITE SITE: ABNORMAL
SOURCE SOURCE: ABNORMAL

## 2018-06-14 ENCOUNTER — HOSPITAL ENCOUNTER (OUTPATIENT)
Dept: RADIOLOGY | Facility: MEDICAL CENTER | Age: 58
End: 2018-06-14
Attending: INTERNAL MEDICINE
Payer: COMMERCIAL

## 2018-06-14 DIAGNOSIS — Z12.39 BREAST CANCER SCREENING: ICD-10-CM

## 2018-06-14 PROCEDURE — 77067 SCR MAMMO BI INCL CAD: CPT

## 2018-07-02 ENCOUNTER — OFFICE VISIT (OUTPATIENT)
Dept: HEALTH INFORMATION MANAGEMENT | Facility: MEDICAL CENTER | Age: 58
End: 2018-07-02
Payer: COMMERCIAL

## 2018-07-02 VITALS
HEIGHT: 61 IN | BODY MASS INDEX: 44.93 KG/M2 | DIASTOLIC BLOOD PRESSURE: 64 MMHG | OXYGEN SATURATION: 95 % | WEIGHT: 238 LBS | SYSTOLIC BLOOD PRESSURE: 114 MMHG | HEART RATE: 68 BPM

## 2018-07-02 DIAGNOSIS — E78.1 HYPERTRIGLYCERIDEMIA: ICD-10-CM

## 2018-07-02 DIAGNOSIS — E55.9 VITAMIN D DEFICIENCY: ICD-10-CM

## 2018-07-02 DIAGNOSIS — R60.0 LOWER EXTREMITY EDEMA: ICD-10-CM

## 2018-07-02 DIAGNOSIS — E88.810 METABOLIC SYNDROME: ICD-10-CM

## 2018-07-02 DIAGNOSIS — E66.9 OBESITY (BMI 30-39.9): ICD-10-CM

## 2018-07-02 DIAGNOSIS — E28.2 PCO (POLYCYSTIC OVARIES): ICD-10-CM

## 2018-07-02 DIAGNOSIS — I10 ESSENTIAL HYPERTENSION: ICD-10-CM

## 2018-07-02 DIAGNOSIS — E66.01 MORBID OBESITY (HCC): ICD-10-CM

## 2018-07-02 DIAGNOSIS — R53.82 CHRONIC FATIGUE: ICD-10-CM

## 2018-07-02 PROCEDURE — 99214 OFFICE O/P EST MOD 30 MIN: CPT | Performed by: INTERNAL MEDICINE

## 2018-07-02 PROCEDURE — 97803 MED NUTRITION INDIV SUBSEQ: CPT | Performed by: DIETITIAN, REGISTERED

## 2018-07-02 NOTE — PROGRESS NOTES
"Nutrition Reassess: Medical Weight Management   Today's date: 7/2/2018  Referring Provider: Eric Low M.D.      Time: in/out  10:22-10:35am   Visit#: 6    Subjective:  -Pt states she has been following the meal plan consistently, even if eating out.   -She feels she is in a good place with knowing how to eat healthy.   -Is using Orgain powder w/ 1/4 c oats and 1/4 banana for breakfast   -Is limiting her carb intake throughout the day. Sticking with protein and vegetables. But not completely keto diet.   -If having a starch/high carb food, will reduce portion size to 1/2 cup or less.   -Is going to the gym 1 day per week with her . Wants to start walking more     Diet history:   Breakfast - Orgain protein powder w/ 1/4 banana and 1/4 c oats   Snack - none or nuts or cheese or veggies   Lunch - [facility] overs or [facility] or salad at [facility]   Snack - same as above   Dinner -  salad or protein w/ veggies     Anthropometrics/Objective  Today's weight:  238lb  Height: 5'1\"  BMI: 44.97  Starting weight/date 270.2lb     Total weight change : -32.2lb            Meal Plan:   High protein diet with 1 meal replacements per day   Ok to use Orgain powder for MR at breakfast. Ideally would leave out the banana if adding oats     ReAssesment/Notes:  Pt is pleased with her progress and is following through with a healthy diet. Also she is happy to see the way weight loss has helped her in her day to day life (camping, taking stairs more easily etc) She is watching her portions, listening to her appetite and making healthier choices when dinning out. Her  is also following a low carb diet with her. She will continue the current meal plan without changes. This month, however, encouraged pt to work on increased exercise. She wants to start walking (1.5miles) so she can do better on her trip in Sept.     Follow-up: 4 weeks    "

## 2018-07-02 NOTE — PROGRESS NOTES
"Bariatric Medicine Follow Up  Chief Complaint   Patient presents with   • Weight Gain       History of Present Illness:   Jojo Gil is a 57 y.o. female who presents for weight management follow-up and to help address co-morbidities related to overweight, including PCOS, hypertension, hypertriglyceridemia.    During the patient's last visit, the following were discussed and recommended:  Weight Goal: 3-5% wt loss each month (pt goal weight is 150 lb)  Diet: High Protein/Low Carb Meals and 2 snacks between meals daily  >100 g protein, <100 g total carbs daily  64+ oz water per day  Avoid sweet drinks and sodas  Track daily intake with macros program  Physical Activity: Walking 2 miles per day  Risk level for moderate/vigorous exercise program: Low  New Rx: None  D/c hydrochlorothiazide  Side Effects: Will review consent if applicable.  Behavior change: Mindful eating, tracking  Follow-up: One month     Trying to weigh herself every other day.  Has not been hard to stick with plan.  Stil tracking with Macros,  Even while camping.  1300 kcal per day.  Somedays 1100 kcal. Protein 86, carbs  62 per day.  has lost 70 lbs, eating what she is eating.  Reading all labels.    Started taking all metformin at night (for PCOS).  Feels this is working better for her.  Hopes to come off metformin with more weight loss.  HCTZ about 50% of time for AYO.  Has not needed as much.  Sleeping well.  Not on statin or fenofibrate.  Blood pressure very well controlled off antihypertensive medication.      Exercise:   Walking mostly for work, going to gym once per wk for full w/o, house for personal workouts  More energy to do work at home  Trying to stand more at work     Review of Systems   Denies lightheadedness, fatigue, insomnia, constipation.  All other ROS were reviewed and are otherwise unchanged from my previous visit with patient.    Physical Exam:    /64   Pulse 68   Ht 1.549 m (5' 1\")   Wt 108 kg (238 lb)   " SpO2 95%   BMI 44.97 kg/m²   Waist: 48.25 in  Body fat % 55  REE 1723 kcal/day    Weight change since last visit: -6 lb (-32 lb total)  Waist Circum change since last visit: -0.25 in (-5.75 in total)    Constitutional: Oriented to person, place, and time and well-developed, well-nourished, and in no distress.    Head: Normocephalic.   Musculoskeletal: Normal range of motion. No edema.   Neurological: Alert and oriented to person, place, and time. No muscle weakness.  Gait normal.   Skin: Warm and dry. Not diaphoretic.   Psychiatric: Mood, memory, affect and judgment normal.     Laboratory:   None new.      Dietitian Assessment: I have reviewed the Dietitian's assessment related to this encounter.       ASSESSMENT/PLAN:  Body mass index is 44.97 kg/m².    Obesity Stage ([place]): 2; Class 3    1. Morbid obesity (HCC)     2. Metabolic syndrome     3. Chronic fatigue     4. Vitamin D deficiency     5. Hypertriglyceridemia     6. Essential hypertension     7. PCO (polycystic ovaries)     8. Lower extremity edema       The patient is really doing well.  She has lost over 30 pounds, almost 6 inches off her waist.  She has embraced low-carb/high-protein food choices.  Her metabolic syndrome/PCOS is controlled, doing well on metformin.  Not currently requiring vitamin D repletion.  Fatigue much improved.  Blood pressure well controlled.  Lower extremity edema well controlled, only using HCTZ intermittently.  Recheck lipids with further weight loss.    The patient and I have discussed at length and agree to the following recommendations, which are all addressing the above diagnoses:    Weight Goal: 3-5% wt loss each month (pt goal weight is 150 lb)  Diet: High Protein/Low Carb Meals and 2 snacks between meals daily  >100 g protein, <100 g total carbs daily as she is doing  About 1200 kcal per day  64+ oz water per day  Avoid sweet drinks and sodas  Track daily intake with macros  Physical Activity: Walking, gym, activities  at home daily  Risk level for moderate/vigorous exercise program: Low  New Rx: None  Behavior change: Mindful eating, tracking  Follow-up: 4-6 weeks    Patient's body mass index is 44.97 kg/m². Exercise and nutrition counseling were performed at this visit.

## 2018-07-18 ENCOUNTER — OFFICE VISIT (OUTPATIENT)
Dept: MEDICAL GROUP | Facility: PHYSICIAN GROUP | Age: 58
End: 2018-07-18
Payer: COMMERCIAL

## 2018-07-18 VITALS
BODY MASS INDEX: 44.56 KG/M2 | OXYGEN SATURATION: 96 % | HEART RATE: 70 BPM | WEIGHT: 236 LBS | RESPIRATION RATE: 16 BRPM | HEIGHT: 61 IN | SYSTOLIC BLOOD PRESSURE: 116 MMHG | TEMPERATURE: 98.3 F | DIASTOLIC BLOOD PRESSURE: 62 MMHG

## 2018-07-18 DIAGNOSIS — R79.89 LOW VITAMIN D LEVEL: ICD-10-CM

## 2018-07-18 DIAGNOSIS — E66.01 MORBID OBESITY WITH BMI OF 40.0-44.9, ADULT (HCC): ICD-10-CM

## 2018-07-18 DIAGNOSIS — E28.2 PCO (POLYCYSTIC OVARIES): ICD-10-CM

## 2018-07-18 DIAGNOSIS — J45.990 EXERCISE-INDUCED ASTHMA: ICD-10-CM

## 2018-07-18 DIAGNOSIS — E78.1 HYPERTRIGLYCERIDEMIA: ICD-10-CM

## 2018-07-18 DIAGNOSIS — I10 ESSENTIAL HYPERTENSION: ICD-10-CM

## 2018-07-18 DIAGNOSIS — R60.0 LOWER EXTREMITY EDEMA: ICD-10-CM

## 2018-07-18 PROCEDURE — 99396 PREV VISIT EST AGE 40-64: CPT | Performed by: INTERNAL MEDICINE

## 2018-07-18 NOTE — ASSESSMENT & PLAN NOTE
She has been having great improvement with the HIP which she started end of February. She has lost 40 lbs. Her  is also on board with the plan and has been preparing their meals and has lost 65 lbs. They are both feeling better and have a lot more energy. She just got back from her annual camping trip and felt a lot more energy this year than last.

## 2018-07-18 NOTE — PROGRESS NOTES
PRIMARY CARE CLINIC ANNUAL EXAM  Chief Complaint   Patient presents with   • Annual Exam       History of Present Illness     Morbid obesity with BMI of 40.0-44.9, adult (HCC)  She has been having great improvement with the HIP which she started end of February. She has lost 40 lbs. Her  is also on board with the plan and has been preparing their meals and has lost 65 lbs. They are both feeling better and have a lot more energy. She just got back from her annual camping trip and felt a lot more energy this year than last.     Lower extremity edema  Taking HCTZ only as needed. Blood pressure is great off of it.     Hypertension  Off of HCTZ with weight loss. Using it only as needed for LE edema.     Exercise-induced asthma  Has ventolin as needed but hasn't required it since she had a nasty cold in the winter.     Vitamin D deficiency  Level was 19 as of 2/13/2018 and she has been taking vitamin D3 5000 units daily.     PCO (polycystic ovaries)  Following with Dr. Mcnair in endocrinology and is on metformin 500 mg bid. Her HgbA1c has been 5.8%. She was having shakes with bid dosing and has been taking both tablets at night. She is following with Dr. Mcnair every 6 months.     Current Outpatient Prescriptions   Medication Sig Dispense Refill   • metFORMIN ER (GLUCOPHAGE XR) 500 MG TABLET SR 24 HR Take 1 Tab by mouth 2 times a day.     • VENTOLIN  (90 Base) MCG/ACT Aero Soln inhalation aerosol INHALE 1 TO 2 PUFFS BY MOUTH EVERY 4 TO 6 HOURS AS NEEDED FOR DIFFICULTY BREATHING  0     No current facility-administered medications for this visit.      Past Medical History:   Diagnosis Date   • Exercise-induced asthma 1/15/2018   • Hypertension 1/15/2018   • PCO (polycystic ovaries) 1996     Past Surgical History:   Procedure Laterality Date   • CHOLECYSTECTOMY  2012   • CATARACT EXTRACTION WITH IOL Bilateral 1999   • TONSILLECTOMY  1989   • TUBAL COAGULATION LAPAROSCOPIC BILATERAL  1985   • ABDOMINAL  "HYSTERECTOMY TOTAL       Social History   Substance Use Topics   • Smoking status: Never Smoker   • Smokeless tobacco: Never Used   • Alcohol use Yes      Comment: rare (holidays)      Social History     Social History Narrative          Family History   Problem Relation Age of Onset   • Other Mother      smoker   • Obesity Father      Family Status   Relation Status   • Mother Alive   • Father      Allergies: Epinephrine and Penicillins    ROS  As per HPI above. All other systems reviewed and negative.        Objective   Blood pressure 116/62, pulse 70, temperature 36.8 °C (98.3 °F), resp. rate 16, height 1.549 m (5' 1\"), weight 107 kg (236 lb), SpO2 96 %. Body mass index is 44.59 kg/m².    General: alert and oriented, pleasant, cooperative  HEENT: Normocephalic, atraumatic. No thyroid masses. Oropharynx clear without exudate or injection.   Cardiovascular: regular rate and rhythm, normal S1/S2  Pulmonary: lungs clear to auscultation bilaterally  Gastrointestinal: no tenderness to palpation. No hepatosplenomegaly. Bowel sounds normoactive  Lymphatics: no cervical or supraclavicular lymphadenopathy   Skin: warm and dry, no lesions or rashes  Psychiatric: appropriate mood and affect. Good insight and appropriate judgment     Assessment and Plan   The following treatment plan was discussed     1. Morbid obesity with BMI of 40.0-44.9, adult (HCC)  Jojo is doing great with her progress with the HIP and feels a lot more energy. She is encouraged that she will continue to lose weight. Her next appointment with [facility] is 2018.   - Patient identified as having weight management issue.  Appropriate orders and counseling given.    2. Lower extremity edema  None today, uses HCTZ as needed.     3. Essential hypertension  Well controlled off of HCTZ with weight loss.   - COMP METABOLIC PANEL; Future  - CBC WITH DIFFERENTIAL; Future    4. Exercise-induced asthma  Quiescent, has ventolin as needed. "     5. Low vitamin D level  Level was 19 as of 2/2018, is taking 5000 units daily. Will check with annual labs 2/2019.   - VITAMIN D,25 HYDROXY; Future    6. PCO (polycystic ovaries)  Following with Dr. Mcnari and taking metformin 1 g qHS. She follows with her q6 months.     7. Hypertriglyceridemia  Lab Results   Component Value Date/Time    CHOLSTRLTOT 181 02/13/2018 07:12 AM    LDL 95 02/13/2018 07:12 AM    HDL 47 02/13/2018 07:12 AM    TRIGLYCERIDE 197 (H) 02/13/2018 07:12 AM     Had great weight loss and dietary improvements. Will check with annual labs 2/2019.   - LIPID PROFILE; Future      Healthcare maintenance     Health Maintenance Due   Topic Date Due   • PAP SMEAR  09/15/1982     Will follow up with her ob/gyn Dr. Lorenzo for her pap smear     Return in about 8 months (around 3/18/2019).    Eric Low MD  Internal Medicine  [facility]

## 2018-07-18 NOTE — ASSESSMENT & PLAN NOTE
Following with Dr. Mcnair in endocrinology and is on metformin 500 mg bid. Her HgbA1c has been 5.8%. She was having shakes with bid dosing and has been taking both tablets at night. She is following with Dr. Mcnair every 6 months.

## 2018-07-23 DIAGNOSIS — I10 ESSENTIAL HYPERTENSION: ICD-10-CM

## 2018-07-23 RX ORDER — HYDROCHLOROTHIAZIDE 25 MG/1
25 TABLET ORAL DAILY
OUTPATIENT
Start: 2018-07-23

## 2018-08-08 ENCOUNTER — HOSPITAL ENCOUNTER (OUTPATIENT)
Dept: LAB | Facility: MEDICAL CENTER | Age: 58
End: 2018-08-08
Attending: OBSTETRICS & GYNECOLOGY
Payer: COMMERCIAL

## 2018-08-08 LAB
CRP SERPL HS-MCNC: 10.5 MG/L (ref 0–7.5)
ESTRADIOL SERPL-MCNC: 37 PG/ML
FSH SERPL-ACNC: 40.8 MIU/ML
PROGEST SERPL-MCNC: 0.15 NG/ML

## 2018-08-08 PROCEDURE — 83001 ASSAY OF GONADOTROPIN (FSH): CPT

## 2018-08-08 PROCEDURE — 82670 ASSAY OF TOTAL ESTRADIOL: CPT

## 2018-08-08 PROCEDURE — 86141 C-REACTIVE PROTEIN HS: CPT

## 2018-08-08 PROCEDURE — 84144 ASSAY OF PROGESTERONE: CPT

## 2018-08-08 PROCEDURE — 36415 COLL VENOUS BLD VENIPUNCTURE: CPT

## 2018-08-14 ENCOUNTER — OFFICE VISIT (OUTPATIENT)
Dept: HEALTH INFORMATION MANAGEMENT | Facility: MEDICAL CENTER | Age: 58
End: 2018-08-14
Payer: COMMERCIAL

## 2018-08-14 VITALS
SYSTOLIC BLOOD PRESSURE: 124 MMHG | HEART RATE: 69 BPM | BODY MASS INDEX: 44.78 KG/M2 | DIASTOLIC BLOOD PRESSURE: 78 MMHG | WEIGHT: 237.2 LBS | OXYGEN SATURATION: 95 % | HEIGHT: 61 IN

## 2018-08-14 DIAGNOSIS — R53.82 CHRONIC FATIGUE: ICD-10-CM

## 2018-08-14 DIAGNOSIS — E66.01 MORBID OBESITY WITH BMI OF 40.0-44.9, ADULT (HCC): ICD-10-CM

## 2018-08-14 DIAGNOSIS — I10 ESSENTIAL HYPERTENSION: ICD-10-CM

## 2018-08-14 DIAGNOSIS — R79.89 LOW VITAMIN D LEVEL: ICD-10-CM

## 2018-08-14 DIAGNOSIS — E28.2 PCO (POLYCYSTIC OVARIES): ICD-10-CM

## 2018-08-14 DIAGNOSIS — E88.810 METABOLIC SYNDROME: ICD-10-CM

## 2018-08-14 DIAGNOSIS — G47.9 SLEEPING DIFFICULTY: ICD-10-CM

## 2018-08-14 PROCEDURE — 99214 OFFICE O/P EST MOD 30 MIN: CPT | Performed by: INTERNAL MEDICINE

## 2018-08-14 PROCEDURE — 97803 MED NUTRITION INDIV SUBSEQ: CPT | Performed by: DIETITIAN, REGISTERED

## 2018-08-14 NOTE — PROGRESS NOTES
"Bariatric Medicine Follow Up  Chief Complaint   Patient presents with   • Weight Gain   • Fatigue       History of Present Illness:   Jojo Gil is a 57 y.o. female who presents for weight management follow-up and to help address co-morbidities related to overweight, including metabolic syndrome, VDD, HLD, HTN, PCOS, AYO.    During the patient's last visit, the following were discussed and recommended:  Weight Goal: 3-5% wt loss each month (pt goal weight is 150 lb)  Diet: High Protein/Low Carb Meals and 2 snacks between meals daily  >100 g protein, <100 g total carbs daily as she is doing  About 1200 kcal per day  64+ oz water per day  Avoid sweet drinks and sodas  Track daily intake with macros  Physical Activity: Walking, gym, activities at home daily  Risk level for moderate/vigorous exercise program: Low  New Rx: None  Behavior change: Mindful eating, tracking  Follow-up: 4-6 weeks    Stressful month, stress eating.  Eating more \"good\" food but more of them.  Lots of snacks.  Still has gone down to size 18 from 26 last year.  Still wants to lose more.    Applied for a new job, she is hoping to be less stressful.  Also in commercial real estate.    On metformin for PCOS.  Continues on vitamin D repletion.  Not on statin.  Blood pressure controlled off antihypertensive.  Not on diuretic, rarely on it but does take after out in heat.  Minimal lower extremity edema.  To start estrogen drops.    Exercise:   Gym twice per week     Review of Systems   Denies lightheadedness, change in bowel movements.  Not having excessive hunger.  All other ROS were reviewed and are otherwise unchanged from my previous visit with patient.    Physical Exam:    /78   Pulse 69   Ht 1.549 m (5' 1\")   Wt 107.6 kg (237 lb 3.2 oz)   SpO2 95%   BMI 44.82 kg/m²   Waist: 48 in  Body fat % 44.8  REE 1720 kcal/day    Weight change since last visit: -1 lb (-33 lb total)  Waist Circum change since last visit:  0 in (-5 in " total)    Constitutional: Oriented to person, place, and time and well-developed, well-nourished, and in no distress.    Head: Normocephalic.    Musculoskeletal: Normal range of motion. No edema.   Neurological: Alert and oriented to person, place, and time. No muscle weakness.  Gait normal.   Skin: Warm and dry. Not diaphoretic.   Psychiatric: Mood, memory, affect and judgment normal.     Laboratory:   None new.      Dietitian Assessment: I have reviewed the Dietitian's assessment related to this encounter.       ASSESSMENT/PLAN:  Body mass index is 44.82 kg/m².    Obesity Stage ([place]): 2; Class 3    1. Morbid obesity with BMI of 40.0-44.9, adult ([facility])     2. Low vitamin D level  VITAMIN D 25-HYDROXY   3. Metabolic syndrome  BASIC METABOLIC PANEL   4. Chronic fatigue     5. Sleeping difficulty     6. PCO (polycystic ovaries)     7. Essential hypertension  BASIC METABOLIC PANEL     The patient is doing well, watching kcal and CHO intake.  Stress eating appears to have improved.  Continue vitamin D repletion.  Chronic fatigue and sleep appear to be stable.  Continue metformin for PCOS.  Blood pressure controlled.    The patient and I have discussed at length and agree to the following recommendations, which are all addressing the above diagnoses:    Weight Goal: 3-5% wt loss each month (pt goal weight is 150 lb)  Diet: High Protein/Low Carb Meals and 2 snacks between meals daily  >100 g protein, <100 g total carbs daily  64+ oz water per day  Avoid sweet drinks and sodas  Track daily intake  Physical Activity: Gym at least twice per week  Risk level for moderate/vigorous exercise program: Low  New Rx: None  Behavior change: Stress management, mindful eating  Follow-up: One month    Patient's body mass index is 44.82 kg/m². Exercise and nutrition counseling were performed at this visit.

## 2018-08-14 NOTE — PROGRESS NOTES
"Nutrition Reassess: Medical Weight Management   Today's date: 8/14/2018  Referring Provider: Eric Low M.D.      Time: in/out 11:55-12:08  Visit#: 6    Subjective:   - Jojo has been \"so-so\" this month. She is going through some stress at work and her  is starting a new job as well. She is finding herself stress-eating.   - For the most part, with exception of once, she is still having protein and NSV snacks when she stress eats   - She also started some estrogen this month   - She is still going to the gym 2 x week. She would like to maybe add a 3rd day after her 's schedule becomes a little more routine as well as add walking dogs when the weather cools off    Diet history (24 hour):   Breakfast - Orgain shake w/ banana and oats ( makes)  Snack - mixed nuts  Lunch - ahi tuna w/ brocoli  Snack - none yesterday  Dinner - beef w/ cauliflower    Anthropometrics/Objective  Today's weight:    Vitals:    08/14/18 1133   BP: 124/78   Weight: 107.6 kg (237 lb 3.2 oz)   Height: 1.549 m (5' 1\")     BMI:  Body mass index is 44.82 kg/m².  Starting weight/date: 270.2 lb (2/27/18)  Total weight change : -33.5 lb            Meal Plan:   Low CHO / high protein / calorie controlled with 1 meal replacements per day     ReAssesment/Notes:  I discussed emotional eating with Jojo today. I provided positive reinforcement for recognizing it was emotional hunger and trying to choose healthier snacks options. At home, she is able to engage in non-food-related self-soothing activities when stressed, but at work it is more difficult. She will continue to be mindful of true vs emotional hunger and before eating d/t stress, she will try having a beverage (water, hot tea, black coffee) and waiting 15 min before acting on the urge and when she does, she will continue to choose protein and NSV. I also suggested berries over a banana in her AM smoothie, she will ask her  to make this change or look into changing " her chocolate to vanilla flavor that goes better with berries.     Follow-up: 4-6 weeks with both MD and RD

## 2018-09-24 ENCOUNTER — OFFICE VISIT (OUTPATIENT)
Dept: HEALTH INFORMATION MANAGEMENT | Facility: MEDICAL CENTER | Age: 58
End: 2018-09-24
Payer: COMMERCIAL

## 2018-09-24 VITALS
HEIGHT: 61 IN | WEIGHT: 232.9 LBS | BODY MASS INDEX: 43.97 KG/M2 | SYSTOLIC BLOOD PRESSURE: 118 MMHG | OXYGEN SATURATION: 99 % | HEART RATE: 57 BPM | DIASTOLIC BLOOD PRESSURE: 78 MMHG

## 2018-09-24 DIAGNOSIS — E16.1 HYPERINSULINEMIA: ICD-10-CM

## 2018-09-24 DIAGNOSIS — R53.82 CHRONIC FATIGUE: ICD-10-CM

## 2018-09-24 DIAGNOSIS — E66.9 OBESITY (BMI 30-39.9): ICD-10-CM

## 2018-09-24 DIAGNOSIS — E28.2 PCO (POLYCYSTIC OVARIES): ICD-10-CM

## 2018-09-24 DIAGNOSIS — E88.810 METABOLIC SYNDROME: ICD-10-CM

## 2018-09-24 DIAGNOSIS — I10 ESSENTIAL HYPERTENSION: ICD-10-CM

## 2018-09-24 DIAGNOSIS — E66.01 MORBID OBESITY WITH BMI OF 40.0-44.9, ADULT (HCC): ICD-10-CM

## 2018-09-24 DIAGNOSIS — E78.1 HYPERTRIGLYCERIDEMIA: ICD-10-CM

## 2018-09-24 DIAGNOSIS — R60.0 LOWER EXTREMITY EDEMA: ICD-10-CM

## 2018-09-24 DIAGNOSIS — R79.89 LOW VITAMIN D LEVEL: ICD-10-CM

## 2018-09-24 DIAGNOSIS — G47.9 SLEEPING DIFFICULTY: ICD-10-CM

## 2018-09-24 PROCEDURE — 97803 MED NUTRITION INDIV SUBSEQ: CPT | Performed by: DIETITIAN, REGISTERED

## 2018-09-24 PROCEDURE — 99214 OFFICE O/P EST MOD 30 MIN: CPT | Performed by: INTERNAL MEDICINE

## 2018-09-24 NOTE — PROGRESS NOTES
"Bariatric Medicine Follow Up  Chief Complaint   Patient presents with   • Weight Gain       History of Present Illness:   Jojo Gil is a 58 y.o. female who presents for weight management follow-up and to help address co-morbidities related to overweight, including metabolic syndrome.    During the patient's last visit, the following were discussed and recommended:  Weight Goal: 3-5% wt loss each month (pt goal weight is 150 lb)  Diet: High Protein/Low Carb Meals and 2 snacks between meals daily  >100 g protein, <100 g total carbs daily  64+ oz water per day  Avoid sweet drinks and sodas  Track daily intake  Physical Activity: Gym at least twice per week  Risk level for moderate/vigorous exercise program: Low  New Rx: None  Behavior change: Stress management, mindful eating  Follow-up: One month    Tracking about half the time.  0649-1286 kcal/day, still keeping CHO <100 g per day.   went back to work.  Stress at pt's work but mindful.  Snacks are now fruit or nuts.  [employer]ales flour cracker she likes, 1 g CHO per cracker.    The patient continues on metformin for PCOS.  On vitamin D repletion.  Blood pressure controlled off antihypertensives.  Want to reduce metformin but wants to wait to see Endo.    Exercise:   2 days per wk in gym but QOD starting this wk     Review of Systems   Denies lightheadedness, fatigue, excessive hunger or change in bowel movements.  All other ROS were reviewed and are otherwise unchanged from my previous visit with patient.    Physical Exam:    /78 (BP Location: Right arm, Patient Position: Sitting)   Pulse (!) 57   Ht 1.549 m (5' 1\")   Wt 105.6 kg (232 lb 14.4 oz)   SpO2 99%   BMI 44.01 kg/m²   Waist: 47 in  Body fat % 54  REE 1705 kcal/day    Weight change since last visit: -5 lb (-36 lb total)  Waist Circum change since last visit: -1 in (-6 in total)    Constitutional: Oriented to person, place, and time and well-developed, well-nourished, and in no distress.  "   Head: Normocephalic.   Musculoskeletal: Normal range of motion. No edema.   Neurological: Alert and oriented to person, place, and time. No muscle weakness.  Gait normal.   Skin: Warm and dry. Not diaphoretic.   Psychiatric: Mood, memory, affect and judgment normal.     Laboratory:   None new.      Dietitian Assessment: I have reviewed the Dietitian's assessment related to this encounter.       ASSESSMENT/PLAN:  Body mass index is 44.01 kg/m².    Obesity Stage ([place]): 2; Class 3    1. Morbid obesity with BMI of 40.0-44.9, adult ([facility])     2. Low vitamin D level     3. Lower extremity edema     4. Metabolic syndrome  BASIC METABOLIC PANEL   5. Hyperinsulinemia     6. Sleeping difficulty     7. Chronic fatigue     8. Hypertriglyceridemia  LIPID PROFILE   9. PCO (polycystic ovaries)     10. Essential hypertension       The patient continues to make great progress with weight loss, by eating more mindfully, tracking her intake.  She is not on weight loss medication.  Stimulus narrowing using food is really helping.  Different mindset is making the biggest change.  Continue metformin for PCO S, also likely helping with appetite suppression.  Continue vitamin D repletion.  Lower extremity edema resolved.  Monitor blood glucose, lipids, blood pressure and fatigue level with further weight loss; should continue to improve.  Update labs prior to next visit.    The patient and I have discussed at length and agree to the following recommendations, which are all addressing the above diagnoses:    Weight Goal: 3-5% wt loss each month (pt goal weight is 150 lb)  Diet: High Protein/Low Carb Meals and 2 snacks between meals daily  >100 g protein, <100 g total carbs daily, less than 1600 kcal per day  64+ oz water per day  Avoid sweet drinks and sodas  Track daily intake with my fitness pal as she is doing  Physical Activity: Every other day in home gym  Risk level for moderate/vigorous exercise program: Low  New Rx: None.   Metformin dosing per endocrinologist.  Suggest reducing dose by 50%.  Behavior change: Mindful eating, tracking  Follow-up: One month    Patient's body mass index is 44.01 kg/m². Exercise and nutrition counseling were performed at this visit.

## 2018-09-24 NOTE — PROGRESS NOTES
"Nutrition Reassess: Medical Weight Management   Today's date: 9/24/2018  Referring Provider: Eric Low M.D.    Time: in/out 116-129  Visit#: 7    Subjective:  - finding low carb alternatives like [employer] crackers and having 1 serving  - tracking 1/2 the time to check in with herself  - planning out Thanksgiving holiday meals that better fit her plan, it helps her daughter is keto  - considering increasing exercise, but when she does \"something always hurts\"  - frustrated her  lost weight much quicker than her, knows she still has a ways to go    Diet history:   *per pt no changes, did not feel need to review diet hx at this time    Anthropometrics/Objective  Today's weight:    Vitals:    09/24/18 1243   BP: 118/78   Weight: 105.6 kg (232 lb 14.4 oz)   Height: 1.549 m (5' 1\")     BMI:  Body mass index is 44.01 kg/m².  Starting weight/date: 270.2 lb (2/27/18)    Total weight change: - 37.3 lbs          Meal Plan:   Low CHO / high protein / calorie controlled with 1 meal replacements per day     ReAssesment/Notes:   Jojo continues to make progress and has been more mindful of her food choices. She is choosing lower carb options when available and balancing out her foods throughout the day. If she chooses to have an Rx bar, she will have less carbs the rest of the day and finds that this works well for her still allowing her to eat some of the foods she loves. She continues to track her intake periodically to help her stay accountable and is considering increasing the gym to every other day (4 days a week). Recommended she start slow and build up to this as a goal to help avoid injury. I love that she is thinking ahead for her holiday plans as well. She will follow-up with Dr. Villanueva only on her next visit and will decide if she would like to continue checking in with and RD. As her body composition changes, her calorie goals may as well. Increasing her exercise should help to increase her RMR as " well.     Follow-up: MD only on next visit

## 2018-10-08 ENCOUNTER — APPOINTMENT (OUTPATIENT)
Dept: LAB | Facility: MEDICAL CENTER | Age: 58
End: 2018-10-08
Payer: COMMERCIAL

## 2018-10-19 ENCOUNTER — HOSPITAL ENCOUNTER (OUTPATIENT)
Dept: LAB | Facility: MEDICAL CENTER | Age: 58
End: 2018-10-19
Attending: INTERNAL MEDICINE
Payer: COMMERCIAL

## 2018-10-19 DIAGNOSIS — E88.810 METABOLIC SYNDROME: ICD-10-CM

## 2018-10-19 DIAGNOSIS — E78.1 HYPERTRIGLYCERIDEMIA: ICD-10-CM

## 2018-10-19 LAB
25(OH)D3 SERPL-MCNC: 25 NG/ML (ref 30–100)
ANION GAP SERPL CALC-SCNC: 8 MMOL/L (ref 0–11.9)
BUN SERPL-MCNC: 15 MG/DL (ref 8–22)
CALCIUM SERPL-MCNC: 9.6 MG/DL (ref 8.5–10.5)
CHLORIDE SERPL-SCNC: 103 MMOL/L (ref 96–112)
CHOLEST SERPL-MCNC: 202 MG/DL (ref 100–199)
CO2 SERPL-SCNC: 27 MMOL/L (ref 20–33)
CREAT SERPL-MCNC: 0.84 MG/DL (ref 0.5–1.4)
GLUCOSE SERPL-MCNC: 96 MG/DL (ref 65–99)
HDLC SERPL-MCNC: 51 MG/DL
LDLC SERPL CALC-MCNC: 111 MG/DL
POTASSIUM SERPL-SCNC: 4.2 MMOL/L (ref 3.6–5.5)
SODIUM SERPL-SCNC: 138 MMOL/L (ref 135–145)
TRIGL SERPL-MCNC: 199 MG/DL (ref 0–149)

## 2018-10-19 PROCEDURE — 80048 BASIC METABOLIC PNL TOTAL CA: CPT

## 2018-10-19 PROCEDURE — 80061 LIPID PANEL: CPT

## 2018-10-19 PROCEDURE — 36415 COLL VENOUS BLD VENIPUNCTURE: CPT

## 2018-10-19 PROCEDURE — 82306 VITAMIN D 25 HYDROXY: CPT

## 2018-10-25 ENCOUNTER — OFFICE VISIT (OUTPATIENT)
Dept: URGENT CARE | Facility: PHYSICIAN GROUP | Age: 58
End: 2018-10-25
Payer: COMMERCIAL

## 2018-10-25 VITALS
SYSTOLIC BLOOD PRESSURE: 126 MMHG | RESPIRATION RATE: 16 BRPM | OXYGEN SATURATION: 97 % | TEMPERATURE: 97.9 F | WEIGHT: 232 LBS | BODY MASS INDEX: 43.84 KG/M2 | HEART RATE: 58 BPM | DIASTOLIC BLOOD PRESSURE: 72 MMHG

## 2018-10-25 DIAGNOSIS — B02.9 HERPES ZOSTER WITHOUT COMPLICATION: ICD-10-CM

## 2018-10-25 PROCEDURE — 99214 OFFICE O/P EST MOD 30 MIN: CPT | Performed by: PHYSICIAN ASSISTANT

## 2018-10-25 RX ORDER — VALACYCLOVIR HYDROCHLORIDE 1 G/1
1000 TABLET, FILM COATED ORAL 3 TIMES DAILY
Qty: 21 TAB | Refills: 0 | Status: SHIPPED | OUTPATIENT
Start: 2018-10-25 | End: 2018-11-01

## 2018-10-25 ASSESSMENT — ENCOUNTER SYMPTOMS
DIARRHEA: 0
ABDOMINAL PAIN: 0
CHILLS: 0
CARDIOVASCULAR NEGATIVE: 1
NAUSEA: 0
FEVER: 0
SHORTNESS OF BREATH: 0
COUGH: 0
VOMITING: 0
SORE THROAT: 0
FATIGUE: 0

## 2018-10-25 NOTE — PROGRESS NOTES
Subjective:      Jojo Gil is a 58 y.o. female who presents with Herpes Zoster (possible shingles left side of back around to left breast)            Rash   This is a new problem. The current episode started in the past 7 days. The problem has been gradually worsening since onset. The affected locations include the torso. The rash is characterized by swelling, redness, burning and blistering. She was exposed to nothing. Pertinent negatives include no congestion, cough, diarrhea, facial edema, fatigue, fever, shortness of breath, sore throat or vomiting. Past treatments include nothing. The treatment provided no relief. Her past medical history is significant for varicella.       PMH:  has a past medical history of Exercise-induced asthma (1/15/2018); Hypertension (1/15/2018); and PCO (polycystic ovaries) (1996).  MEDS:   Current Outpatient Prescriptions:   •  valacyclovir (VALTREX) 1 GM Tab, Take 1 Tab by mouth 3 times a day for 7 days., Disp: 21 Tab, Rfl: 0  •  cholecalciferol (VITAMIN D3) 5000 UNIT Cap, Take 1 Cap by mouth every day., Disp: 30 Cap, Rfl: 0  •  metFORMIN ER (GLUCOPHAGE XR) 500 MG TABLET SR 24 HR, Take 1 Tab by mouth 2 times a day., Disp: , Rfl:   •  VENTOLIN  (90 Base) MCG/ACT Aero Soln inhalation aerosol, INHALE 1 TO 2 PUFFS BY MOUTH EVERY 4 TO 6 HOURS AS NEEDED FOR DIFFICULTY BREATHING, Disp: , Rfl: 0  ALLERGIES:   Allergies   Allergen Reactions   • Epinephrine      Highly sensitive    • Penicillins      SURGHX:   Past Surgical History:   Procedure Laterality Date   • CHOLECYSTECTOMY  2012   • CATARACT EXTRACTION WITH IOL Bilateral 1999   • TONSILLECTOMY  1989   • TUBAL COAGULATION LAPAROSCOPIC BILATERAL  1985   • ABDOMINAL HYSTERECTOMY TOTAL       SOCHX:  reports that she has never smoked. She has never used smokeless tobacco. She reports that she drinks alcohol. She reports that she does not use drugs.  FH: family history includes Obesity in her father; Other in her mother.    Review  of Systems   Constitutional: Negative for chills, fatigue and fever.   HENT: Negative for congestion, ear pain and sore throat.    Respiratory: Negative for cough and shortness of breath.    Cardiovascular: Negative.    Gastrointestinal: Negative for abdominal pain, diarrhea, nausea and vomiting.   Skin: Positive for itching and rash.       Medications, Allergies, and current problem list reviewed today in Epic     Objective:     /72 (BP Location: Left arm, Patient Position: Sitting)   Pulse (!) 58   Temp 36.6 °C (97.9 °F)   Resp 16   Wt 105.2 kg (232 lb)   SpO2 97%   BMI 43.84 kg/m²      Physical Exam   Constitutional: She is oriented to person, place, and time. She appears well-developed and well-nourished. No distress.   HENT:   Head: Normocephalic and atraumatic.   Eyes: Conjunctivae and EOM are normal.   Neck: Normal range of motion. Neck supple.   Cardiovascular: Normal rate, regular rhythm and normal heart sounds.    Pulmonary/Chest: Effort normal and breath sounds normal. No respiratory distress. She has no wheezes.   Neurological: She is alert and oriented to person, place, and time.   Skin: Skin is warm and dry. Rash noted. Rash is vesicular (Painful, erythematous, vesicular rash on the left torso.  Rash does not cross midline.). She is not diaphoretic.        Psychiatric: She has a normal mood and affect. Her behavior is normal. Judgment and thought content normal.   Nursing note and vitals reviewed.              Assessment/Plan:     1. Herpes zoster without complication  valacyclovir (VALTREX) 1 GM Tab     Classic shingles rash  OTC meds and conservative measures as discussed  Handout given including red flag symptoms  Return to clinic or go to ED if symptoms worsen or persist. Indications for ED discussed at length. Patient voices understanding. Follow-up with your primary care provider in 3-5 days. Red flags discussed. All side effects of medication discussed including allergic response, GI  upset, tendon injury, etc.    Please note that this dictation was created using voice recognition software. I have made every reasonable attempt to correct obvious errors, but I expect that there are errors of grammar and possibly content that I did not discover before finalizing the note.

## 2018-11-05 ENCOUNTER — OFFICE VISIT (OUTPATIENT)
Dept: HEALTH INFORMATION MANAGEMENT | Facility: MEDICAL CENTER | Age: 58
End: 2018-11-05
Payer: COMMERCIAL

## 2018-11-05 VITALS
BODY MASS INDEX: 44.16 KG/M2 | HEART RATE: 65 BPM | SYSTOLIC BLOOD PRESSURE: 118 MMHG | OXYGEN SATURATION: 96 % | WEIGHT: 233.9 LBS | DIASTOLIC BLOOD PRESSURE: 68 MMHG | HEIGHT: 61 IN

## 2018-11-05 DIAGNOSIS — E88.810 METABOLIC SYNDROME: ICD-10-CM

## 2018-11-05 DIAGNOSIS — I10 ESSENTIAL HYPERTENSION: ICD-10-CM

## 2018-11-05 DIAGNOSIS — E78.1 HYPERTRIGLYCERIDEMIA: ICD-10-CM

## 2018-11-05 DIAGNOSIS — R79.89 LOW VITAMIN D LEVEL: ICD-10-CM

## 2018-11-05 DIAGNOSIS — E66.01 MORBID OBESITY WITH BMI OF 40.0-44.9, ADULT (HCC): ICD-10-CM

## 2018-11-05 PROCEDURE — 99214 OFFICE O/P EST MOD 30 MIN: CPT | Performed by: INTERNAL MEDICINE

## 2018-11-05 NOTE — PROGRESS NOTES
"Bariatric Medicine Follow Up  Chief Complaint   Patient presents with   • Weight Gain       History of Present Illness:   Jojo Gil is a 58 y.o. female who presents for weight management follow-up and to help address co-morbidities related to overweight, including VDD, metabolic syndrome.    During the patient's last visit, the following were discussed and recommended:  Weight Goal: 3-5% wt loss each month (pt goal weight is 150 lb)  Diet: High Protein/Low Carb Meals and 2 snacks between meals daily  >100 g protein, <100 g total carbs daily, less than 1600 kcal per day  64+ oz water per day  Avoid sweet drinks and sodas  Track daily intake with my fitness pal as she is doing  Physical Activity: Every other day in home gym  Risk level for moderate/vigorous exercise program: Low  New Rx: None.  Metformin dosing per endocrinologist.  Suggest reducing dose by 50%.  Behavior change: Mindful eating, tracking  Follow-up: One month    Had not been tracking, needed new phone.  Now tracking again.  Now watching intake on her own.   no longer participating.  Still making better choices when eating out.  Only got candy on Halloween night to give out, not before.  Better snacks.    Feels much more confident.  Feels a lot better eating less flour, carbohydrates, feels she notices a difference.  Is feeling much hungrier.  Would consider weight loss medication but wants information and to do research first.    On vitamin D repletion with last vitamin D level improved since 2/2018.  On metformin for PCOS.  Triglycerides still elevated on last labs compared to 2/2018. Has not changed metformin dosing, waiting for HbA1c in 12/2018.  Not using diuretic.    Exercise:   Home gym QOD     Review of Systems   Denies AYO, lightheadedness, worsening fatigue or change in BMs.  All other ROS were reviewed and are otherwise unchanged from my previous visit with patient.    Physical Exam:    /68   Pulse 65   Ht 1.549 m (5' 1\")  "  Wt 106.1 kg (233 lb 14.4 oz)   SpO2 96%   BMI 44.20 kg/m²   Waist:  46 in  Body fat % 53.8  REE 1703 kcal/day    Weight change since last visit:  1 lb (-36 lb total)  Waist Circum change since last visit: -1 in (-7 in total)    Constitutional: Oriented to person, place, and time and well-developed, well-nourished, and in no distress.    Head: Normocephalic.    Musculoskeletal: Normal range of motion. No edema.   Neurological: Alert and oriented to person, place, and time. No muscle weakness.  Gait normal.   Skin: Warm and dry. Not diaphoretic.   Psychiatric: Mood, memory, affect and judgment normal.     Laboratory:   Recent labs reviewed.  , vitamin D 25      ASSESSMENT/PLAN:  Body mass index is 44.2 kg/m².    Obesity Stage ([place]): 2; Class 3    1. Low vitamin D level     2. Metabolic syndrome     3. Hypertriglyceridemia     4. Essential hypertension     5. Morbid obesity with BMI of 40.0-44.9, adult ([facility])       The patient continues to make slow and steady progress with weight loss.  Her triglycerides have not yet improved, as she is not reduce carbohydrates enough.  Encouraged resumption of tracking, CHO reduction as per previous guidelines.  Weight loss will help with metabolic syndrome, triglycerides, maintaining well controlled blood pressure.  Patient considering weight loss medication especially given hunger has increased.    The patient and I have discussed at length and agree to the following recommendations, which are all addressing the above diagnoses:    Weight Goal: 3-5% wt loss each month (pt goal weight is 150 lb)  Diet: High Protein/Low Carb Meals and 2 snacks between meals daily  >100 g protein, <100 g total carbs daily, around 1500 kcal per day  64+ oz water per day  Avoid sweet drinks and sodas  Track daily intake with my fitness pal!  Physical Activity: Home gym every other day  Risk level for moderate/vigorous exercise program: Low  New Rx: None.  Patient considering weight loss  medication, with information given on multiple options.  Behavior change: Resume tracking  Follow-up: One month  RD visits every other month.    Patient's body mass index is 44.2 kg/m². Exercise and nutrition counseling were performed at this visit.

## 2018-12-07 ENCOUNTER — HOSPITAL ENCOUNTER (OUTPATIENT)
Dept: LAB | Facility: MEDICAL CENTER | Age: 58
End: 2018-12-07
Attending: INTERNAL MEDICINE
Payer: COMMERCIAL

## 2018-12-07 LAB — 25(OH)D3 SERPL-MCNC: 25 NG/ML (ref 30–100)

## 2018-12-07 PROCEDURE — 82306 VITAMIN D 25 HYDROXY: CPT

## 2018-12-07 PROCEDURE — 36415 COLL VENOUS BLD VENIPUNCTURE: CPT

## 2018-12-07 PROCEDURE — 83036 HEMOGLOBIN GLYCOSYLATED A1C: CPT

## 2018-12-11 LAB
EST. AVERAGE GLUCOSE BLD GHB EST-MCNC: 131 MG/DL
HBA1C MFR BLD: 6.2 % (ref 0–5.6)

## 2018-12-18 ENCOUNTER — APPOINTMENT (OUTPATIENT)
Dept: HEALTH INFORMATION MANAGEMENT | Facility: MEDICAL CENTER | Age: 58
End: 2018-12-18
Payer: COMMERCIAL

## 2019-01-10 ENCOUNTER — OFFICE VISIT (OUTPATIENT)
Dept: HEALTH INFORMATION MANAGEMENT | Facility: MEDICAL CENTER | Age: 59
End: 2019-01-10
Payer: COMMERCIAL

## 2019-01-10 VITALS
SYSTOLIC BLOOD PRESSURE: 120 MMHG | WEIGHT: 235.6 LBS | DIASTOLIC BLOOD PRESSURE: 68 MMHG | BODY MASS INDEX: 44.48 KG/M2 | HEIGHT: 61 IN | OXYGEN SATURATION: 96 % | HEART RATE: 68 BPM

## 2019-01-10 DIAGNOSIS — R79.89 LOW VITAMIN D LEVEL: ICD-10-CM

## 2019-01-10 DIAGNOSIS — E16.1 HYPERINSULINEMIA: ICD-10-CM

## 2019-01-10 DIAGNOSIS — I10 ESSENTIAL HYPERTENSION: ICD-10-CM

## 2019-01-10 DIAGNOSIS — E66.01 MORBID OBESITY WITH BMI OF 40.0-44.9, ADULT (HCC): ICD-10-CM

## 2019-01-10 DIAGNOSIS — E78.1 HYPERTRIGLYCERIDEMIA: ICD-10-CM

## 2019-01-10 DIAGNOSIS — E88.810 METABOLIC SYNDROME: ICD-10-CM

## 2019-01-10 PROCEDURE — 99214 OFFICE O/P EST MOD 30 MIN: CPT | Performed by: INTERNAL MEDICINE

## 2019-01-10 NOTE — PROGRESS NOTES
"Bariatric Medicine Follow Up  Chief Complaint   Patient presents with   • Weight Gain       History of Present Illness:   Jojo Gil is a 58 y.o. female who presents for weight management follow-up and to help address co-morbidities related to overweight, including VDD, HTN, HLD/TG.    During the patient's last visit, the following were discussed and recommended:  Weight Goal: 3-5% wt loss each month (pt goal weight is 150 lb)  Diet: High Protein/Low Carb Meals and 2 snacks between meals daily  >100 g protein, <100 g total carbs daily, around 1500 kcal per day  64+ oz water per day  Avoid sweet drinks and sodas  Track daily intake with my fitness pal!  Physical Activity: Home gym every other day  Risk level for moderate/vigorous exercise program: Low  New Rx: None.  Patient considering weight loss medication, with information given on multiple options.  Behavior change: Resume tracking  Follow-up: One month  RD visits every other month.    Just resumed tracking intake.  Not consistent but goal is consistent tracking daily.  Wants to consider antiobesity med but not yet.  Easier to resume low CHO this year.  She gained about 6 pounds over the holidays, was eating a lot more sweet food and carbs than she had in previous months.    Did not tolerate higher dose of metformin, which her endocrinologist recently put her on.  Is back to metformin 500 twice daily.  Hemoglobin A1c did go up slightly, but patient feels she wants to work on food related changes first.  Continues on vitamin D repletion.  Blood pressure controlled off antihypertensives.    Exercise:   Total gym at home  Driving to gym on weekends     Review of Systems   Denies headache, lightheadedness, diaphoresis.  All other ROS were reviewed and are otherwise unchanged from my previous visit with patient.    Physical Exam:    /68 (BP Location: Left arm, Patient Position: Sitting, BP Cuff Size: Large adult)   Pulse 68   Ht 1.549 m (5' 1\")   Wt 106.9 " kg (235 lb 9.6 oz)   SpO2 96%   BMI 44.52 kg/m²   Waist: 47.5 in    Body fat % 55.6  REE 1714 kcal/day    Weight change since last visit: +2 lb (-35 lb total)  Waist Circum change since last visit: +1.5 in (-5.5 in total)    Constitutional: Oriented to person, place, and time and well-developed, well-nourished, and in no distress.    Head: Normocephalic.   Musculoskeletal: Normal range of motion. No edema.   Neurological: Alert and oriented to person, place, and time. No muscle weakness.  Gait normal.   Skin: Warm and dry. Not diaphoretic.   Psychiatric: Mood, memory, affect and judgment normal.     Laboratory:   Recent labs reviewed.      ASSESSMENT/PLAN:  Body mass index is 44.52 kg/m².    Obesity Stage ([place]): 1; Class 3    1. Morbid obesity with BMI of 40.0-44.9, adult ([facility])     2. Low vitamin D level     3. Metabolic syndrome     4. Hyperinsulinemia     5. Hypertriglyceridemia     6. Essential hypertension       Although the patient has had success over the last year in the program, her weight loss stabilized with the holidays especially.  Needs to resume tracking, mindful eating.  Continue metformin with hemoglobin A1c not yet controlled.  Would benefit from anti-obesity medication but patient defers.  Continue vitamin D.  Blood pressure controlled.  Monitor lipids, vitamin D and fasting glucose which should improve with further weight loss.  Increase activity.    The patient and I have discussed at length and agree to the following recommendations, which are all addressing the above diagnoses:    Weight Goal: 3-5% wt loss each month (pt goal weight is 150 lb)  Next goal is 200 lb by June.  Diet: High Protein/Low Carb Meals and 2 snacks between meals daily  >100 g protein, <100 g total carbs daily, around 1500 kcal per day  64+ oz water per day  Avoid sweet drinks and sodas  Track daily intake with my fitness pal  Physical Activity: Home gym  Risk level for moderate/vigorous exercise program: Low  New  Rx: None.  Patient defers anti-obesity medication.  Behavior change: Resume tracking, mindful eating, increase activity  Follow-up: one month    Patient's body mass index is 44.52 kg/m². Exercise and nutrition counseling were performed at this visit.

## 2019-01-29 ENCOUNTER — PATIENT MESSAGE (OUTPATIENT)
Dept: MEDICAL GROUP | Facility: PHYSICIAN GROUP | Age: 59
End: 2019-01-29

## 2019-02-11 ENCOUNTER — OFFICE VISIT (OUTPATIENT)
Dept: HEALTH INFORMATION MANAGEMENT | Facility: MEDICAL CENTER | Age: 59
End: 2019-02-11
Payer: COMMERCIAL

## 2019-02-11 VITALS
SYSTOLIC BLOOD PRESSURE: 124 MMHG | DIASTOLIC BLOOD PRESSURE: 80 MMHG | BODY MASS INDEX: 45.12 KG/M2 | WEIGHT: 239 LBS | OXYGEN SATURATION: 95 % | HEIGHT: 61 IN | HEART RATE: 70 BPM

## 2019-02-11 DIAGNOSIS — E55.9 VITAMIN D DEFICIENCY: ICD-10-CM

## 2019-02-11 DIAGNOSIS — I10 ESSENTIAL HYPERTENSION: ICD-10-CM

## 2019-02-11 DIAGNOSIS — E66.01 MORBID OBESITY WITH BMI OF 40.0-44.9, ADULT (HCC): ICD-10-CM

## 2019-02-11 DIAGNOSIS — E28.2 PCO (POLYCYSTIC OVARIES): ICD-10-CM

## 2019-02-11 DIAGNOSIS — E78.1 HYPERTRIGLYCERIDEMIA: ICD-10-CM

## 2019-02-11 DIAGNOSIS — E88.810 METABOLIC SYNDROME: ICD-10-CM

## 2019-02-11 DIAGNOSIS — R53.82 CHRONIC FATIGUE: ICD-10-CM

## 2019-02-11 DIAGNOSIS — G47.9 SLEEPING DIFFICULTY: ICD-10-CM

## 2019-02-11 PROCEDURE — 99214 OFFICE O/P EST MOD 30 MIN: CPT | Performed by: INTERNAL MEDICINE

## 2019-02-11 NOTE — PROGRESS NOTES
"Bariatric Medicine Follow Up  Chief Complaint   Patient presents with   • Weight Gain       History of Present Illness:   Jojo Gil is a 58 y.o. female who presents for weight management follow-up and to help address co-morbidities related to overweight, including HTN, HLD/TG, VDD.    During the patient's last visit, the following were discussed and recommended:  Weight Goal: 3-5% wt loss each month (pt goal weight is 150 lb)  Next goal is 200 lb by June.  Diet: High Protein/Low Carb Meals and 2 snacks between meals daily  >100 g protein, <100 g total carbs daily, around 1500 kcal per day  64+ oz water per day  Avoid sweet drinks and sodas  Track daily intake with my fitness pal  Physical Activity: Home gym  Risk level for moderate/vigorous exercise program: Low  New Rx: None.  Patient defers anti-obesity medication.  Behavior change: Resume tracking, mindful eating, increase activity  Follow-up: one month    Snacks are nuts, almond crackers, vegetables, still better choices. 1600 kcal/day on average.  Still eating too much. Wants to try antiobesity medication.  Afraid of getting \"hooked\" on them.  Did have a problem with very low-dose epinephrine at the dentist office several times, has never tried phentermine in the past.    BP controlled off antihypertensives.  Tolerating metformin well.  Continues on Vit D repletion.    Exercise:   None  Not yet back to the gym  Shoveled snow yesterday     Review of Systems   Denies  All other ROS were reviewed and are otherwise unchanged from my previous visit with patient.    Physical Exam:    /80 (BP Location: Left arm, Patient Position: Sitting, BP Cuff Size: Large adult)   Pulse 70   Ht 1.549 m (5' 1\")   Wt 108.4 kg (239 lb)   SpO2 95%   BMI 45.16 kg/m²      Weight change since last visit:  +4 lb (-31 lb total)  Waist Circum change since last visit:  0 in (5.25 in total)    Constitutional: Oriented to person, place, and time and well-developed, " well-nourished, and in no distress.    Head: Normocephalic.   Musculoskeletal: Normal range of motion. No edema.   Neurological: Alert and oriented to person, place, and time. No muscle weakness.  Gait normal.   Skin: Warm and dry. Not diaphoretic.   Psychiatric: Mood, memory, affect and judgment normal.     Laboratory:   Recent labs reviewed.    ASSESSMENT/PLAN:  Body mass index is 45.16 kg/m².    Obesity Stage ([place]):  2; Class 3    1. Metabolic syndrome     2. Sleeping difficulty     3. Vitamin D deficiency     4. Chronic fatigue     5. Hypertriglyceridemia     6. PCO (polycystic ovaries)     7. Essential hypertension     8. Morbid obesity with BMI of 40.0-44.9, adult ([facility])  Lorcaserin HCl 10 MG Tab     The patient is maintaining her wt loss but feels a plateau.  Will add antiobesity medication.  Should improve fatigue, total kcal reduction.  Continue metformin given PCOS, Vit D repletion.      The patient and I have discussed at length and agree to the following recommendations, which are all addressing the above diagnoses:    Weight Goal: 3-5% wt loss each month (pt goal weight is 150 lb)  Diet: High Protein/Low Carb Meals and 2 snacks between meals daily  >100 g protein, <100 g total carbs daily, 1400 kcal/day  64+ oz water per day  Avoid sweet drinks and sodas  Track daily intake with [employer]itnessPal, as she is doing  Physical Activity:  Resume gym  Risk level for moderate/vigorous exercise program: low  New Rx: start Belviq  Phentermine is contraindicated with pt's h/o epi allergy  Has had hysterectomy  Side Effects: Consent reviewed, signed, in chart.  Behavior change: increase activity  Follow-up: 1 mo    Patient's body mass index is 45.16 kg/m². Exercise and nutrition counseling were performed at this visit.

## 2019-02-11 NOTE — LETTER
Medical Weight Management  Patient Consent Form For Contrave (Naltrexone/buproprion)    I hereby authorize the physician and their staff to assist me in my weight reduction efforts. I understand that my treatment program consists of a balanced diet, a regular exercise program, instruction in behavior maya?cation techniques, meeting with a registered dietician, and the use of the appetite suppressant medication Contrave. I also understand that regular medical visits will be necessary while on the medication and that Contrave must be used with caution and under direct supervision of the physician.    Risk of Proposed Treatment: I understand that any medical treatment may involve risks as well as the proposed bene?ts of weight loss. I understand that this authorization is given with the knowledge that the use of Contrave involves risk. Risks of Contrave include but are not limited to suicidal thoughts, sudden decrease in vision, glaucoma, birth defects in your unborn child if you take this medication while pregnant, hypoglycemia.  Risks can also include nervousness, diarrhea, constipation, sleeplessness, headache, tremor, fever, fainting, dry mouth, rash, change in libido, difficulty urinating, shortness of breath, swelling of feet or ankles, tiredness, dizziness, weakness, allergic reactions, psychological imbalances, hallucinations, stomach cramps, high blood pressure, palpitations, arrhythmias, rapid heart rate, hepatitis, clay, depression, insomnia. In case of serious side effects or if you become pregnant, stop taking the Contrave and seek immediate medical assistance. I also understand that there are certain health risks associated with remaining overweight or obese, including high blood pressure, diabetes, heart attack and heart disease, arthritis of the joints, sleep apnea, and sudden death. Do not use with alcohol.    I further understand that Contrave should not be used by people who suffer from  glaucoma, seizures, anorexia or bulimia, those with a history of drug dependency, opioid dependency, alcoholism, psychotic illness, uncontrolled hypertension, pregnancy, on MAOI’s, serotonin migraine medications, Wellbutrin or other buproprion-containing medication, or lithium.    While taking Contrave, avoid taking the following medications: Decongestant medications (Sudafed/Pseudoephedrine, Tylenol Sinus, Claritin D, Zyrtec D and Allegra D), Stimulant medications, high doses of caffeine, other weight loss medications, ephedrine MAO inhibitions and alcohol.    Patient Responsibility:   As the patient, I understand it is my responsibility to follow instructions carefully, and to report to the physician any significant medical problems that I think may be related to my weight control program as soon as possible. I agree to notify the physician of any medical problems that I may have or any results of labs/tests ordered and reviewed by any other physician. I further acknowledge that I enter into this program in full knowledge and understanding that no physician, provider, or staff of the weight loss physician has prior knowledge as to whether I would or would not have adverse effects due to the fact that each individual has a different biological and chemical make -up. I understand the purpose of this treatment is to assist me in my desire to decrease my body weight and to maintain this weight loss. I understand my continuing to receive Contrave will be dependent on my progress in weight reduction and weight maintenance.  If I am female, I will need to have a monthly negative pregnancy test in order to receive the next month’s prescription. I understand that a balanced caloric counting program combined with regular exercise without the use of Contrave may likely prove successful if followed, even though I would be hungrier than without the suppressant.    I am also in full understanding that Contrave will be used no  longer than 3 consecutive months. After 3 months of use the medication will be discontinued.     If you and the physician agree to use the medication longer than 3 months or if your BMI has decreased below the Federal Drug Administration's recommended value you will be using the medication in an off-label manner.  Contrave may result in lethargy or depression with abrupt discontinuation and I understand that during the program, medications will be discontinued if:  1.) I become pregnant, try to become pregnant, or suspect that I am pregnant.  2.) I develop a contraindication or serious side effect of the medication.  3.) I do not comply with medical requirements, i.e. visits, med doses, etc.  4.) I fail to lose and/or maintain weight appropriately.  5.) I have a planned surgery. Medications are to be stopped at least 2 weeks prior to any surgical procedure requiring general anesthesia.    Women Only: I understand Contrave should not be taken during pregnancy, due to the chance of damage to the fetus. This has been explained to me fully, and I am aware of the risks involved. To the best of my knowledge, I am not pregnant. I am aware of the precautions that should be taken to avoid pregnancy while I am on the medication. If I become pregnant, I will advise both the physician and my OB/GYN immediately. In addition, Contrave is not to be used while breast feeding.    No Guarantee:  I understand that much of the success of the program will depend on my effort, and that there is no guarantee that the program will be successful.  I understand that I will have to continue with sensible and nutritional eating habits and regular exercise all my life, if I am able to be successful long-term.     Patient Consent/Waiver:   I have read and fully understand this document and authorize and accept the proposed care regardless of the risk. I affirm that my questions have been satisfactorily answered at this time. I realize that I  should not sign this form if all items are not understood by me or if questions have not been answered to my satisfaction. I hereby release the physician and [facility] from any liability associated and connected with my participation in this weight loss program.  I accept the risks as discussed above, in hopes of obtaining desired bene?cial results of weight loss treatment. I understand it is my responsibility to give the physician the name of my primary care physician where labs and/or EKG can be obtained for follow through and interpretation, if need be.     WARNING: If you have any questions as to the risks or hazards of the proposed treatment, or any questions whatsoever concerning the proposed treatment or other possible treatments, ask the physician now before signing this consent form. To conclude, by signing this document you are agreeing to the risks associated with Contrave. You are agreeing that to be successful in your weight loss goals you must alter your lifestyle and adopt healthy eating and exercise patterns. You are agreeing that you are not pregnant or breast feeding. You are agreeing that you understand Contrave may, in rare instances, be addictive. You are agreeing that you must notify the physician of any medical conditions current or that develop while taking Contrave and you are agreeing that this document has been adequately explained to you and that you understand the document in its entirety.    Alternatives to treatment, and no treatment, including the risks and benefits thereof have been discussed with me.      _____________________________________  Patient / Authorized Legal Representative    _____________________________________  Relationship to Patient (if not patient)    _____________________________________  Date/Time    Provider Declaration:   I have explained the contents of this document to the patient and have answered all the patient’s related questions. To the best of my  knowledge, I feel the patient has been adequately informed concerning the bene?ts and risks associated with the use of Contrave, the bene?ts and risks associated with alternative therapies, and the risks concerning an overweight status. After being adequately informed, the patient has consented.      _____________________________________  Physician Signature    _____________________________________  Physician Name (printed)     _____________________________________  Date/Time

## 2019-02-11 NOTE — LETTER
Medical Weight Management  Patient Consent Form For Qsymia (Phentermine/Topiramate extended-release)    I hereby authorize the physician and her staff to assist me in my weight reduction efforts. I understand that my treatment program consists of a balanced diet, a regular exercise program, instruction in behavior maya?cation techniques, meeting with a registered dietician, and the use of the appetite suppressant medication Qsymia. I also understand that regular medical visits will be necessary while on the medication and that Qsymia must be used with caution and under direct supervision of the physician.    Risk of Proposed Treatment: I understand that any medical treatment may involve risks as well as the proposed bene?ts of weight loss. I understand that this authorization is given with the knowledge that the use of Qsymia involves risk. Risks of Qsymia include but are not limited to suicidal thoughts, sudden decrease in vision, glaucoma, birth defects in your unborn child if you take this medication while pregnant, metabolic acidosis, hypoglycemia, elevated creatinine.  Because Qsymia also contains phentermine, risks can include nervousness, diarrhea, constipation, sleeplessness, headache, tremor, fever, fainting, dry mouth, rash, change in libido, difficulty urinating, shortness of breath, swelling of feet or ankles, tiredness, dizziness, temporary memory loss, weakness, allergic reactions, psychological imbalances, hallucinations, stomach cramps, high blood pressure, palpitations, arrhythmias, rapid heart rate, and gall stones. Although seen only in rare cases, pulmonary hypertension, or heart valve disease may develop. These latter two conditions are serious and can be fatal. In case of serious side effects or if you become pregnant, stop taking the Qsymia and seek immediate medical assistance. In addition, Qsymia can rarely be addictive and should not be used with a history of drug dependence. I also  understand that there are certain health risks associated with remaining overweight or obese, including high blood pressure, diabetes, heart attack and heart disease, arthritis of the joints, sleep apnea, and sudden death.     I further understand that Qsymia should not be used by people who suffer from heart disease, glaucoma, history of a stroke, liver or kidney disease, those with a history of drug dependency, alcoholism, psychotic illness, uncontrolled hypertension, advanced atherosclerosis, thyroid over-activity, people who are on MAOI’s, serotonin migraine medications, or lithium.    While taking Qsymia, avoid taking the following medications: Decongestant medications  (Sudafed/Pseudoephedrine, Tylenol Sinus, Claritin D, Zyrtec D and Allegra D), Stimulant medications, high doses of caffeine, other weight loss medications, ephedrine MAO inhibitions and alcohol.    Patient Responsibility:   As the patient, I understand it is my responsibility to follow instructions carefully, and to report to the physician any significant medical problems that I think may be related to my weight control program as soon as possible. I agree to notify the physician of any medical problems that I may have or any results of labs/tests ordered and reviewed by any other physician. I further acknowledge that I enter into this program in full knowledge and understanding that no physician, provider, or staff of the weight loss physician has prior knowledge as to whether I would or would not have adverse effects due to the fact that each individual has a different biological and chemical make -up. I understand the purpose of this treatment is to assist me in my desire to decrease my body weight and to maintain this weight loss. I understand my continuing to receive Qsymia will be dependent on my progress in weight reduction and weight maintenance.  If I am female, I will need to have a monthly negative pregnancy test in order to receive  the next month’s prescription. I understand that a balanced caloric counting program combined with regular exercise without the use of Qsymia may likely prove successful if followed, even though I would be hungrier than without the suppressant.    I am also in full understanding that Qsymia will be used no longer than 3 consecutive months. After 3 months of use the medication will be discontinued.     If you and the physician agree to use the medication longer than 3 months or if your BMI has decreased below the Federal Drug Administration's recommended value you will be using the medication in an off-label manner.  Qsymia may result in lethargy or depression with abrupt discontinuation and I understand that during the program, medications will be discontinued if:    1.) I become pregnant, try to become pregnant, or suspect that I am pregnant.  2.) I develop a contraindication or serious side effect of the medication.  3.) I do not comply with medical requirements, i.e. visits, med doses, etc.  4.) I fail to lose and/or maintain weight appropriately.  5.) I have a planned surgery. Medications are to be stopped at least 2 weeks prior to any surgical procedure requiring general anesthesia.    Women Only: I understand Qsymia should not be taken during pregnancy, due to the chance of damage to the fetus. This has been explained to me fully, and I am aware of the risks involved. To the best of my knowledge, I am not pregnant. I am aware of the precautions that should be taken to avoid pregnancy while I am on the medication. If I become pregnant, I will advise both the physician and my OB/GYN immediately. In addition, Qsymia is not to be used while breast feeding.    No Guarantee:  I understand that much of the success of the program will depend on my effort, and that there is no guarantee that the program will be successful.  I understand that I will have to continue with sensible and nutritional eating habits and  regular exercise all my life, if I am able to be successful long-term.     Patient Consent/Waiver:   I have read and fully understand this document and authorize and accept the proposed care regardless of the risk. I affirm that my questions have been satisfactorily answered at this time. I realize that I should not sign this form if all items are not understood by me or if questions have not been answered to my satisfaction. I hereby release the physician and [facility] from any liability associated and connected with my participation in this weight loss program. I accept the risks as discussed above, in hopes of obtaining desired bene?cial results of weight loss treatment. I understand it is my responsibility to give the physician the name of my primary care physician where labs and/or EKG can be obtained for follow through and interpretation, if need be.     WARNING: If you have any questions as to the risks or hazards of the proposed treatment, or any questions whatsoever concerning the proposed treatment or other possible treatments, ask the physician now before signing this consent form. To conclude, by signing this document you are agreeing to the risks associated with Qsymia. You are agreeing that to be successful in your weight loss goals you must alter your lifestyle and adopt healthy eating and exercise patterns. You are agreeing that you are not pregnant or breast feeding. You are agreeing that you understand Qsymia may, in rare instances, be addictive. You are agreeing that you must notify the physician of any medical conditions current or that develop while taking Qsymia and you are agreeing that this document has been adequately explained to you and that you understand the document in its entirety.    Alternatives to treatment, and no treatment, including the risks and benefits thereof have been discussed with me.      _____________________________________  Patient / Authorized Legal  Representative      _____________________________________  Relationship to Patient (if not patient)    _____________________________________  Date/Time    Provider Declaration:   I have explained the contents of this document to the patient and have answered all the patient’s related questions. To the best of my knowledge, I feel the patient has been adequately informed concerning the bene?ts and risks associated with the use of Qsymia, the bene?ts and risks associated with alternative therapies, and the risks concerning an overweight status. After being adequately informed, the patient has consented.      ____________________________________  Physician Signature    ____________________________________  Physician Name (printed)     ____________________________________  Date/Time

## 2019-04-03 ENCOUNTER — HOSPITAL ENCOUNTER (OUTPATIENT)
Facility: MEDICAL CENTER | Age: 59
End: 2019-04-03
Attending: INTERNAL MEDICINE
Payer: COMMERCIAL

## 2019-04-03 ENCOUNTER — HOSPITAL ENCOUNTER (OUTPATIENT)
Dept: LAB | Facility: MEDICAL CENTER | Age: 59
End: 2019-04-03
Attending: INTERNAL MEDICINE
Payer: COMMERCIAL

## 2019-04-03 DIAGNOSIS — E88.810 METABOLIC SYNDROME: ICD-10-CM

## 2019-04-03 DIAGNOSIS — I10 ESSENTIAL HYPERTENSION: ICD-10-CM

## 2019-04-03 LAB
25(OH)D3 SERPL-MCNC: 25 NG/ML (ref 30–100)
ANION GAP SERPL CALC-SCNC: 11 MMOL/L (ref 0–11.9)
BUN SERPL-MCNC: 22 MG/DL (ref 8–22)
CALCIUM SERPL-MCNC: 9.6 MG/DL (ref 8.5–10.5)
CHLORIDE SERPL-SCNC: 100 MMOL/L (ref 96–112)
CO2 SERPL-SCNC: 28 MMOL/L (ref 20–33)
CREAT SERPL-MCNC: 0.79 MG/DL (ref 0.5–1.4)
EST. AVERAGE GLUCOSE BLD GHB EST-MCNC: 123 MG/DL
GLUCOSE SERPL-MCNC: 89 MG/DL (ref 65–99)
HBA1C MFR BLD: 5.9 % (ref 0–5.6)
POTASSIUM SERPL-SCNC: 4.1 MMOL/L (ref 3.6–5.5)
SODIUM SERPL-SCNC: 139 MMOL/L (ref 135–145)

## 2019-04-03 PROCEDURE — 82306 VITAMIN D 25 HYDROXY: CPT

## 2019-04-03 PROCEDURE — 83036 HEMOGLOBIN GLYCOSYLATED A1C: CPT

## 2019-04-03 PROCEDURE — 80048 BASIC METABOLIC PNL TOTAL CA: CPT

## 2019-04-03 PROCEDURE — 36415 COLL VENOUS BLD VENIPUNCTURE: CPT

## 2019-04-08 ENCOUNTER — OFFICE VISIT (OUTPATIENT)
Dept: HEALTH INFORMATION MANAGEMENT | Facility: MEDICAL CENTER | Age: 59
End: 2019-04-08
Payer: COMMERCIAL

## 2019-04-08 VITALS
BODY MASS INDEX: 45.39 KG/M2 | WEIGHT: 240.4 LBS | HEIGHT: 61 IN | SYSTOLIC BLOOD PRESSURE: 120 MMHG | DIASTOLIC BLOOD PRESSURE: 76 MMHG | OXYGEN SATURATION: 95 % | HEART RATE: 76 BPM

## 2019-04-08 DIAGNOSIS — R79.89 LOW VITAMIN D LEVEL: ICD-10-CM

## 2019-04-08 DIAGNOSIS — I10 ESSENTIAL HYPERTENSION: ICD-10-CM

## 2019-04-08 DIAGNOSIS — E88.810 METABOLIC SYNDROME: ICD-10-CM

## 2019-04-08 DIAGNOSIS — E28.2 PCO (POLYCYSTIC OVARIES): ICD-10-CM

## 2019-04-08 DIAGNOSIS — E78.1 HYPERTRIGLYCERIDEMIA: ICD-10-CM

## 2019-04-08 DIAGNOSIS — E66.01 MORBID OBESITY WITH BMI OF 40.0-44.9, ADULT (HCC): ICD-10-CM

## 2019-04-08 PROCEDURE — 99214 OFFICE O/P EST MOD 30 MIN: CPT | Performed by: INTERNAL MEDICINE

## 2019-04-08 NOTE — PROGRESS NOTES
Bariatric Medicine Follow Up  Chief Complaint   Patient presents with   • Weight Gain       History of Present Illness:   Jojo Gil is a 58 y.o. female who presents for weight management follow-up and to help address co-morbidities related to overweight, including PCOS, HLD/TG.    During the patient's last visit, the following were discussed and recommended:  Weight Goal: 3-5% wt loss each month (pt goal weight is 150 lb)  Diet: High Protein/Low Carb Meals and 2 snacks between meals daily  >100 g protein, <100 g total carbs daily, 1400 kcal/day  64+ oz water per day  Avoid sweet drinks and sodas  Track daily intake with My FitnessPal, as she is doing  Physical Activity:  Resume gym  Risk level for moderate/vigorous exercise program: low  New Rx: start Belviq  Phentermine is contraindicated with pt's h/o epi allergy  Has had hysterectomy  Side Effects: Consent reviewed, signed, in chart.  Behavior change: increase activity  Follow-up: 1 mo    Never started Belviq.  Wants to consider anti-obesity medication, but she is concerned about becoming dependent upon them, especially for mood changes and is reluctant to take medication long-term.    Seeing Endocrinologist for PCOS, is on metformin.  Would be willing to discuss GLP-1 or SGL P2 medications to help with weight loss with her other physician.  Does not want to start any other medication without her endocrinologist's approval.    Has had trouble maintaining a lower carb diet, as she feels her  is now not supportive.  He has gone back to work, is not eating as well and she struggles to maintain a balanced diet when he is not doing the same.  He also does not want to go to the gym.    Continues on vitamin D repletion.  Not on a statin, fenofibrate.    Exercise:   She has not resumed going to the gym, wants to start with a friend     Review of Systems   Denies lightheadedness, worsening fatigue or mood changes.  Some increased family stressors recently.  All  "other ROS were reviewed and are otherwise unchanged from my previous visit with patient.    Physical Exam:    /76 (BP Location: Left arm, Patient Position: Sitting, BP Cuff Size: Large adult)   Pulse 76   Ht 1.549 m (5' 1\")   Wt 109 kg (240 lb 6.4 oz)   SpO2 95%   BMI 45.42 kg/m²   Waist Measurement   Waist: 49.75 inch/inches  Weight change since last visit: +1 lb (-20 lb total)  Waist Circum change since last visit: +2 in (-3 in total)    Constitutional: Oriented to person, place, and time and well-developed, well-nourished, and in no distress.    Head: Normocephalic.   Musculoskeletal: Normal range of motion. No edema.   Neurological: Alert and oriented to person, place, and time. No muscle weakness.  Gait normal.   Skin: Warm and dry. Not diaphoretic.   Psychiatric: Mood, memory, affect and judgment normal.     Laboratory:   Recent labs reviewed.       ASSESSMENT/PLAN:  Body mass index is 45.42 kg/m².    Obesity Stage ([place]): 2; Class 3    1. Metabolic syndrome     2. Low vitamin D level     3. Hypertriglyceridemia     4. PCO (polycystic ovaries)     5. Essential hypertension     6. Morbid obesity with BMI of 40.0-44.9, adult (HCC)       Although the patient initially lost weight in the program, she has struggled to maintain her weight loss and focus.  I believe strongly she would benefit from anti-obesity medication but patient defers.  May benefit from medication for PCO S, hyper insulin levels as she will discuss with her endocrinologist.  Continue vitamin D repletion.  Blood pressure controlled.    The patient and I have discussed at length and agree to the following recommendations, which are all addressing the above diagnoses:    Weight Goal: 3-5% wt loss each month (pt goal weight is 150 lb)  Diet: High Protein/Low Carb Meals and 2 snacks between meals daily  64+ oz water per day  Avoid sweet drinks and sodas  Resume consistent tracking of intake  Physical Activity: Resume gym  Risk level " for moderate/vigorous exercise program: Low  New Rx: Patient did not want to start Belviq.  Consider liraglutide, semaglutide instead, which she will discuss with her endocrinologist tomorrow.  Behavior change: Commit to change  Follow-up: 1 mo    Patient's body mass index is 45.42 kg/m². Exercise and nutrition counseling were performed at this visit.

## 2019-05-06 ENCOUNTER — OFFICE VISIT (OUTPATIENT)
Dept: HEALTH INFORMATION MANAGEMENT | Facility: MEDICAL CENTER | Age: 59
End: 2019-05-06
Payer: COMMERCIAL

## 2019-05-06 VITALS
SYSTOLIC BLOOD PRESSURE: 128 MMHG | HEART RATE: 78 BPM | BODY MASS INDEX: 46.35 KG/M2 | OXYGEN SATURATION: 98 % | DIASTOLIC BLOOD PRESSURE: 74 MMHG | WEIGHT: 245.5 LBS | HEIGHT: 61 IN

## 2019-05-06 DIAGNOSIS — E88.810 METABOLIC SYNDROME: ICD-10-CM

## 2019-05-06 DIAGNOSIS — E66.01 MORBID OBESITY WITH BMI OF 40.0-44.9, ADULT (HCC): ICD-10-CM

## 2019-05-06 DIAGNOSIS — R53.82 CHRONIC FATIGUE: ICD-10-CM

## 2019-05-06 DIAGNOSIS — E78.1 HYPERTRIGLYCERIDEMIA: ICD-10-CM

## 2019-05-06 DIAGNOSIS — E16.1 HYPERINSULINEMIA: ICD-10-CM

## 2019-05-06 DIAGNOSIS — I10 ESSENTIAL HYPERTENSION: ICD-10-CM

## 2019-05-06 PROCEDURE — 99214 OFFICE O/P EST MOD 30 MIN: CPT | Performed by: INTERNAL MEDICINE

## 2019-05-06 RX ORDER — HYDROCHLOROTHIAZIDE 25 MG/1
25 TABLET ORAL DAILY
Qty: 30 TAB | Refills: 1 | Status: SHIPPED | OUTPATIENT
Start: 2019-05-06 | End: 2019-07-19 | Stop reason: SDUPTHER

## 2019-05-06 RX ORDER — BLOOD SUGAR DIAGNOSTIC
1 STRIP MISCELLANEOUS DAILY
Qty: 1 EACH | Refills: 3 | Status: SHIPPED | OUTPATIENT
Start: 2019-05-06 | End: 2019-06-05

## 2019-05-06 NOTE — PROGRESS NOTES
"Bariatric Medicine Follow Up  Chief Complaint   Patient presents with   • Weight Gain       History of Present Illness:   Jojo Gil is a 58 y.o. female who presents for weight management follow-up and to help address co-morbidities related to overweight, including HLD/TG, HTN, VDD.    During the patient's last visit, the following were discussed and recommended:  Weight Goal: 3-5% wt loss each month (pt goal weight is 150 lb)  Diet: High Protein/Low Carb Meals and 2 snacks between meals daily  64+ oz water per day  Avoid sweet drinks and sodas  Resume consistent tracking of intake  Physical Activity: Resume gym  Risk level for moderate/vigorous exercise program: Low  New Rx: Patient did not want to start Belviq.  Consider liraglutide, semaglutide instead, which she will discuss with her endocrinologist tomorrow.  Behavior change: Commit to change  Follow-up: 1 mo    The patient is very reluctant to use anti-obesity medication, but realizes she is gaining weight.  Discussed liraglutide with her endocrinology providers, they told her not to start it as it would not be covered by her insurance.  She is reluctant to start Belviq, never had the prescription filled but would consider it again.    Not tracking.  Eating better but wt still up.    Forgets Vit D.  She would like to continue hydrochlorothiazide as needed for leg edema, not feeling she needs to take it daily however.  Confirm she is taking metformin daily.  Not on other antihypertensive.  Not on a statin.    Exercise:   Gym, yardwork     Review of Systems   Less fatigued.  Denies lower extremity edema currently.  All other ROS were reviewed and are otherwise unchanged from my previous visit with patient.    Physical Exam:    /74 (BP Location: Left arm, Patient Position: Sitting, BP Cuff Size: Large adult)   Pulse 78   Ht 1.549 m (5' 1\")   Wt 111.4 kg (245 lb 8 oz)   SpO2 98%   BMI 46.39 kg/m²   Waist Measurement   Waist: 50 inch/inches  Weight " change since last visit: +5 lb  Waist Circum change since last visit: +0.25 in     Constitutional: Oriented to person, place, and time and well-developed, well-nourished, and in no distress.    Head: Normocephalic.   Musculoskeletal: Normal range of motion. No edema.   Neurological: Alert and oriented to person, place, and time. No muscle weakness.  Gait normal.   Skin: Warm and dry. Not diaphoretic.   Psychiatric: Mood, memory, affect and judgment normal.     Laboratory:   Recent labs reviewed.      ASSESSMENT/PLAN:  Body mass index is 46.39 kg/m².    Obesity Stage ([place]):  2; Class 3    1. Metabolic syndrome     2. Hyperinsulinemia     3. Chronic fatigue     4. Hypertriglyceridemia     5. Essential hypertension     6. Morbid obesity with BMI of 40.0-44.9, adult (HCC)  Lorcaserin HCl 10 MG Tab    Insulin Pen Needle (PEN NEEDLES) 32G X 6 MM Misc    Liraglutide -Weight Management 18 MG/3ML Solution Pen-injector     Without tracking, it is difficult to monitor portion size, refined carbohydrate intake, although the patient feels she is eating better.  Strongly suggest anti-obesity medication with either liraglutide and/or lorcaserin, which patient will consider.  In order to improve her triglyceride levels, insulin levels and fasting glucose, will need to reduce her refined carbohydrate intake significantly.  Encouraged tracking to better follow.  Fatigue improving.  Blood pressure controlled.  Continue increasing activity, as she is doing    The patient and I have discussed at length and agree to the following recommendations, which are all addressing the above diagnoses:    Weight Goal: 3-5% wt loss each month (pt goal weight is 150 lb)  Diet: Orgain premade shake for snack daily  Resume tracking!  Physical Activity:  Gym 1/wk, yardwork daily  Risk level for moderate/vigorous exercise program: low  New Rx: Pt to decide between Belviq and liraglutide, hesistant to start either.  Also to see what insurance will  cover.  HCTZ prn.  Side Effects: consent in chart  Behavior change: More consistent tracking  Follow-up: 1 mo  Does not want to consider bariatric surgery.    Patient's body mass index is 46.39 kg/m². Exercise and nutrition counseling were performed at this visit.

## 2019-05-08 DIAGNOSIS — E66.01 MORBID OBESITY WITH BMI OF 40.0-44.9, ADULT (HCC): ICD-10-CM

## 2019-06-03 ENCOUNTER — APPOINTMENT (OUTPATIENT)
Dept: HEALTH INFORMATION MANAGEMENT | Facility: MEDICAL CENTER | Age: 59
End: 2019-06-03
Payer: COMMERCIAL

## 2019-07-19 ENCOUNTER — OFFICE VISIT (OUTPATIENT)
Dept: MEDICAL GROUP | Facility: PHYSICIAN GROUP | Age: 59
End: 2019-07-19
Payer: COMMERCIAL

## 2019-07-19 VITALS
DIASTOLIC BLOOD PRESSURE: 64 MMHG | TEMPERATURE: 98.1 F | HEART RATE: 66 BPM | BODY MASS INDEX: 46.44 KG/M2 | OXYGEN SATURATION: 96 % | WEIGHT: 246 LBS | HEIGHT: 61 IN | RESPIRATION RATE: 16 BRPM | SYSTOLIC BLOOD PRESSURE: 120 MMHG

## 2019-07-19 DIAGNOSIS — Z12.39 SCREENING FOR BREAST CANCER: ICD-10-CM

## 2019-07-19 DIAGNOSIS — F40.243 FEAR OF FLYING: ICD-10-CM

## 2019-07-19 DIAGNOSIS — Z00.00 ANNUAL PHYSICAL EXAM: ICD-10-CM

## 2019-07-19 PROCEDURE — 99396 PREV VISIT EST AGE 40-64: CPT | Performed by: INTERNAL MEDICINE

## 2019-07-19 RX ORDER — HYDROCHLOROTHIAZIDE 25 MG/1
25 TABLET ORAL DAILY
Qty: 90 TAB | Refills: 3 | Status: SHIPPED | OUTPATIENT
Start: 2019-07-19 | End: 2020-09-08

## 2019-07-19 RX ORDER — ALPRAZOLAM 0.25 MG/1
0.25 TABLET ORAL NIGHTLY PRN
Qty: 30 TAB | Refills: 0 | Status: SHIPPED | OUTPATIENT
Start: 2019-07-19 | End: 2019-08-18

## 2019-07-19 RX ORDER — ALPRAZOLAM 0.25 MG/1
0.25 TABLET ORAL NIGHTLY PRN
COMMUNITY
End: 2019-07-19 | Stop reason: SDUPTHER

## 2019-07-19 RX ORDER — ALBUTEROL SULFATE 90 UG/1
1 AEROSOL, METERED RESPIRATORY (INHALATION) EVERY 6 HOURS PRN
Qty: 8.5 G | Refills: 3 | Status: SHIPPED | OUTPATIENT
Start: 2019-07-19 | End: 2020-10-02

## 2019-07-19 ASSESSMENT — PATIENT HEALTH QUESTIONNAIRE - PHQ9: CLINICAL INTERPRETATION OF PHQ2 SCORE: 0

## 2019-07-19 NOTE — PROGRESS NOTES
PRIMARY CARE CLINIC ANNUAL EXAM  Chief Complaint   Patient presents with   • Annual Exam          Annual physical exam  She stopped the metformin, followed by Dr. Mcnair, due to GI upset. Had her pap smear recently with her gynecologist's office. Had a cervical procedure a few years ago with normal paps since. Was following with weight management program but has put that on hold right now; knows what to do but needs to be re-motivated to do that. Uses HCTZ as needed for edema.     Current Outpatient Prescriptions   Medication Sig Dispense Refill   • hydroCHLOROthiazide (HYDRODIURIL) 25 MG Tab Take 1 Tab by mouth every day. 90 Tab 3   • ALPRAZolam (XANAX) 0.25 MG Tab Take 1 Tab by mouth at bedtime as needed for Sleep for up to 30 days. 30 Tab 0   • VENTOLIN  (90 Base) MCG/ACT Aero Soln inhalation aerosol Inhale 1 Puff by mouth every 6 hours as needed for Shortness of Breath. 8.5 g 3   • Fluticasone Propionate (FLONASE ALLERGY RELIEF NA) Spray  in nose.       No current facility-administered medications for this visit.      Past Medical History:   Diagnosis Date   • Exercise-induced asthma 1/15/2018   • Hypertension 1/15/2018   • PCO (polycystic ovaries)      Past Surgical History:   Procedure Laterality Date   • CHOLECYSTECTOMY     • CATARACT EXTRACTION WITH IOL Bilateral    • TONSILLECTOMY     • TUBAL COAGULATION LAPAROSCOPIC BILATERAL     • ABDOMINAL HYSTERECTOMY TOTAL       Social History   Substance Use Topics   • Smoking status: Never Smoker   • Smokeless tobacco: Never Used   • Alcohol use Yes      Comment: rare (holidays)      Social History     Social History Narrative          Family History   Problem Relation Age of Onset   • Other Mother         smoker   • Obesity Father      Family Status   Relation Status   • Mo Alive   • Fa      Allergies: Epinephrine and Penicillins    ROS  As per HPI above. All other systems reviewed and negative.        Objective  "  /64   Pulse 66   Temp 36.7 °C (98.1 °F)   Resp 16   Ht 1.549 m (5' 1\")   Wt 111.6 kg (246 lb)   SpO2 96%  Body mass index is 46.48 kg/m².    General: alert and oriented, pleasant, cooperative  HEENT: Normocephalic, atraumatic. No thyroid masses. Oropharynx clear without exudate or injection.   Cardiovascular: regular rate and rhythm, normal S1/S2  Pulmonary: lungs clear to auscultation bilaterally  Gastrointestinal: no tenderness to palpation. No hepatosplenomegaly. Bowel sounds normoactive  Lymphatics: no cervical or supraclavicular lymphadenopathy   Skin: warm and dry, no lesions or rashes  Psychiatric: appropriate mood and affect. Good insight and appropriate judgment     Assessment and Plan   The following treatment plan was discussed     1. Screening for breast cancer  - MA-SCREENING MAMMO BILAT W/TOMOSYNTHESIS W/CAD; Future    2. Annual physical exam  - Lipid Profile; Future  - hydroCHLOROthiazide (HYDRODIURIL) 25 MG Tab; Take 1 Tab by mouth every day.  Dispense: 90 Tab; Refill: 3  - VENTOLIN  (90 Base) MCG/ACT Aero Soln inhalation aerosol; Inhale 1 Puff by mouth every 6 hours as needed for Shortness of Breath.  Dispense: 8.5 g; Refill: 3    3. Fear of flying  - ALPRAZolam (XANAX) 0.25 MG Tab; Take 1 Tab by mouth at bedtime as needed for Sleep for up to 30 days.  Dispense: 30 Tab; Refill: 0    Will request pap smear records    Healthcare maintenance     Health Maintenance Due   Topic Date Due   • HEPATITIS C SCREENING  1960   • PAP SMEAR  12/01/2012   • MAMMOGRAM  06/14/2019       No Follow-up on file.    Eric Low MD  Internal Medicine  [facility]                   "

## 2019-07-19 NOTE — ASSESSMENT & PLAN NOTE
She stopped the metformin, followed by Dr. Mcnair, due to GI upset. Had her pap smear recently with her gynecologist's office. Had a cervical procedure a few years ago with normal paps since. Was following with weight management program but has put that on hold right now; knows what to do but needs to be re-motivated to do that. Uses HCTZ as needed for edema.

## 2019-08-01 ENCOUNTER — OFFICE VISIT (OUTPATIENT)
Dept: MEDICAL GROUP | Facility: PHYSICIAN GROUP | Age: 59
End: 2019-08-01
Payer: COMMERCIAL

## 2019-08-01 VITALS
BODY MASS INDEX: 46.44 KG/M2 | WEIGHT: 246 LBS | RESPIRATION RATE: 16 BRPM | HEIGHT: 61 IN | TEMPERATURE: 98.4 F | SYSTOLIC BLOOD PRESSURE: 118 MMHG | HEART RATE: 65 BPM | OXYGEN SATURATION: 98 % | DIASTOLIC BLOOD PRESSURE: 68 MMHG

## 2019-08-01 DIAGNOSIS — F40.243 FEAR OF FLYING: ICD-10-CM

## 2019-08-01 DIAGNOSIS — H92.09 OTALGIA, UNSPECIFIED LATERALITY: ICD-10-CM

## 2019-08-01 PROCEDURE — 99214 OFFICE O/P EST MOD 30 MIN: CPT | Performed by: INTERNAL MEDICINE

## 2019-08-01 RX ORDER — MONTELUKAST SODIUM 10 MG/1
10 TABLET ORAL DAILY
Qty: 30 TAB | Refills: 3 | Status: SHIPPED | OUTPATIENT
Start: 2019-08-01 | End: 2019-10-01

## 2019-08-01 RX ORDER — ALPRAZOLAM 0.25 MG/1
0.25 TABLET ORAL
Qty: 15 TAB | Refills: 0 | Status: SHIPPED | OUTPATIENT
Start: 2019-08-01 | End: 2019-08-31

## 2019-08-01 NOTE — ASSESSMENT & PLAN NOTE
Woke up a couple days ago with a lot of ear pressure/pain of her left ear. She tried some drops without relief. Has had a long standing issue with left ear fullness which does result in vertigo symptoms. Takes decongestants early on when she feels the left ear fullness. However, her ear is still a bit plugged and itchy. She is aware of her left ear all the time. When she's in the car and the AC is on she can feel the cold air, she has hypersensitivity. Can't use flonase everyday since it dries out her nares. Uses flonase every couple of days. Has year round allergies.

## 2019-08-01 NOTE — ASSESSMENT & PLAN NOTE
Needs a refill of her xanax 0.25 mg tablets. She only takes 1/2 tablet as needed, doesn't need 30 tablets since her last prescription is nearly 2 years old.

## 2019-08-01 NOTE — PROGRESS NOTES
PRIMARY CARE CLINIC FOLLOW UP VISIT  Chief Complaint   Patient presents with   • Otalgia     Lt side      History of Present Illness     Otalgia  Woke up a couple days ago with a lot of ear pressure/pain of her left ear. She tried some drops without relief. Has had a long standing issue with left ear fullness which does result in vertigo symptoms. Takes decongestants early on when she feels the left ear fullness. However, her ear is still a bit plugged and itchy. She is aware of her left ear all the time. When she's in the car and the AC is on she can feel the cold air, she has hypersensitivity. Can't use flonase everyday since it dries out her nares. Uses flonase every couple of days. Has year round allergies.     Fear of flying  Needs a refill of her xanax 0.25 mg tablets. She only takes 1/2 tablet as needed, doesn't need 30 tablets since her last prescription is nearly 2 years old.     Current Outpatient Medications   Medication Sig Dispense Refill   • montelukast (SINGULAIR) 10 MG Tab Take 1 Tab by mouth every day. 30 Tab 3   • ALPRAZolam (XANAX) 0.25 MG Tab Take 1 Tab by mouth 1 time daily as needed for Anxiety for up to 30 days. 15 Tab 0   • hydroCHLOROthiazide (HYDRODIURIL) 25 MG Tab Take 1 Tab by mouth every day. 90 Tab 3   • ALPRAZolam (XANAX) 0.25 MG Tab Take 1 Tab by mouth at bedtime as needed for Sleep for up to 30 days. 30 Tab 0   • VENTOLIN  (90 Base) MCG/ACT Aero Soln inhalation aerosol Inhale 1 Puff by mouth every 6 hours as needed for Shortness of Breath. 8.5 g 3   • Fluticasone Propionate (FLONASE ALLERGY RELIEF NA) Spray  in nose.       No current facility-administered medications for this visit.      Past Medical History:   Diagnosis Date   • Exercise-induced asthma 1/15/2018   • Hypertension 1/15/2018   • PCO (polycystic ovaries) 1996     Past Surgical History:   Procedure Laterality Date   • CHOLECYSTECTOMY  2012   • CATARACT EXTRACTION WITH IOL Bilateral 1999   • TONSILLECTOMY  1989   •  "TUBAL COAGULATION LAPAROSCOPIC BILATERAL     • ABDOMINAL HYSTERECTOMY TOTAL       Social History     Tobacco Use   • Smoking status: Never Smoker   • Smokeless tobacco: Never Used   Substance Use Topics   • Alcohol use: Yes     Comment: rare (holidays)    • Drug use: No     Social History     Social History Narrative          Family History   Problem Relation Age of Onset   • Other Mother         smoker   • Obesity Father      Family Status   Relation Name Status   • Mo  Alive   • Fa       Allergies: Epinephrine and Penicillins    ROS  As per HPI above. All other systems reviewed and negative.        Objective   /68 (BP Cuff Size: Large adult)   Pulse 65   Temp 36.9 °C (98.4 °F)   Resp 16   Ht 1.549 m (5' 1\")   Wt 111.6 kg (246 lb)   SpO2 98%  Body mass index is 46.48 kg/m².    General: alert and oriented, pleasant, cooperative  HEENT: Normocephalic, atraumatic. Dryness of left ear, no effusion behind TM   Psychiatric: appropriate mood and affect. Good insight and appropriate judgment     Assessment and Plan   The following treatment plan was discussed     1. Otalgia, unspecified laterality  Has long standing issues refractory to sinus rinses, flonase, and antihistamines. Trial of singulair, trying an oil to help lubricate her ear. Check CT of her sinuses.   - CT-LOCALIZATION LIMITED SINUSES; Future  - REFERRAL TO ENT    2. Fear of flying  - ALPRAZolam (XANAX) 0.25 MG Tab; Take 1 Tab by mouth 1 time daily as needed for Anxiety for up to 30 days.  Dispense: 15 Tab; Refill: 0    Healthcare maintenance     Health Maintenance Due   Topic Date Due   • HEPATITIS C SCREENING  1960   • MAMMOGRAM  2019     No follow-ups on file.    Eric Low MD  Internal Medicine  [facility]                 "

## 2019-08-05 ENCOUNTER — HOSPITAL ENCOUNTER (OUTPATIENT)
Dept: RADIOLOGY | Facility: MEDICAL CENTER | Age: 59
End: 2019-08-05
Attending: INTERNAL MEDICINE
Payer: COMMERCIAL

## 2019-08-05 DIAGNOSIS — H92.09 OTALGIA, UNSPECIFIED LATERALITY: ICD-10-CM

## 2019-08-05 DIAGNOSIS — M27.40 MAXILLARY CYST: ICD-10-CM

## 2019-08-05 PROCEDURE — 70486 CT MAXILLOFACIAL W/O DYE: CPT

## 2019-08-06 ENCOUNTER — OFFICE VISIT (OUTPATIENT)
Dept: MEDICAL GROUP | Facility: PHYSICIAN GROUP | Age: 59
End: 2019-08-06
Payer: COMMERCIAL

## 2019-08-06 VITALS
BODY MASS INDEX: 46.44 KG/M2 | OXYGEN SATURATION: 98 % | WEIGHT: 246 LBS | SYSTOLIC BLOOD PRESSURE: 130 MMHG | DIASTOLIC BLOOD PRESSURE: 76 MMHG | RESPIRATION RATE: 16 BRPM | HEIGHT: 61 IN | TEMPERATURE: 99.4 F | HEART RATE: 71 BPM

## 2019-08-06 DIAGNOSIS — K11.5 SIALOLITHIASIS OF SUBMANDIBULAR GLAND: ICD-10-CM

## 2019-08-06 PROBLEM — R59.1 LYMPHADENOPATHY: Status: ACTIVE | Noted: 2019-08-06

## 2019-08-06 PROCEDURE — 99214 OFFICE O/P EST MOD 30 MIN: CPT | Performed by: INTERNAL MEDICINE

## 2019-08-06 NOTE — ASSESSMENT & PLAN NOTE
Had tender submandibular lymphadenopathy that started on 8/2/2019 that progressed over the weekend. On Saturday 8/3 the lymphadenopathy was at its worst. Today the swelling is back down to a marble size.

## 2019-08-06 NOTE — Clinical Note
"Please also let the patient know that I have the following recommendations to help with her swollen gland: keep well hydrated, apply moist heat to the involved area, massage the gland, \"milk\" the duct in addition to a tart, hard candy to help promote salivary flow"

## 2019-08-06 NOTE — PATIENT INSTRUCTIONS
[facility]  [address] [place] [id number]  Phone: [phone]  Fax: [phone]

## 2019-08-07 NOTE — PROGRESS NOTES
PRIMARY CARE CLINIC FOLLOW UP VISIT  Chief Complaint   Patient presents with   • Oral Swelling     x 1 week      History of Present Illness     Sialolithiasis of submandibular gland  Had tender submandibular lymphadenopathy that started on 8/2/2019 that progressed over the weekend. On Saturday 8/3 the lymphadenopathy was at its worst. Today the swelling is back down to a marble size.     Current Outpatient Medications   Medication Sig Dispense Refill   • montelukast (SINGULAIR) 10 MG Tab Take 1 Tab by mouth every day. 30 Tab 3   • ALPRAZolam (XANAX) 0.25 MG Tab Take 1 Tab by mouth 1 time daily as needed for Anxiety for up to 30 days. 15 Tab 0   • hydroCHLOROthiazide (HYDRODIURIL) 25 MG Tab Take 1 Tab by mouth every day. 90 Tab 3   • ALPRAZolam (XANAX) 0.25 MG Tab Take 1 Tab by mouth at bedtime as needed for Sleep for up to 30 days. 30 Tab 0   • VENTOLIN  (90 Base) MCG/ACT Aero Soln inhalation aerosol Inhale 1 Puff by mouth every 6 hours as needed for Shortness of Breath. 8.5 g 3   • Fluticasone Propionate (FLONASE ALLERGY RELIEF NA) Spray  in nose.       No current facility-administered medications for this visit.      Past Medical History:   Diagnosis Date   • Exercise-induced asthma 1/15/2018   • Hypertension 1/15/2018   • PCO (polycystic ovaries) 1996     Past Surgical History:   Procedure Laterality Date   • CHOLECYSTECTOMY  2012   • CATARACT EXTRACTION WITH IOL Bilateral 1999   • TONSILLECTOMY  1989   • TUBAL COAGULATION LAPAROSCOPIC BILATERAL  1985   • ABDOMINAL HYSTERECTOMY TOTAL       Social History     Tobacco Use   • Smoking status: Never Smoker   • Smokeless tobacco: Never Used   Substance Use Topics   • Alcohol use: Yes     Comment: rare (holidays)    • Drug use: No     Social History     Social History Narrative          Family History   Problem Relation Age of Onset   • Other Mother         smoker   • Obesity Father      Family Status   Relation Name Status   • Mo  Alive   • Fa   "     Allergies: Epinephrine and Penicillins    ROS  As per HPI above. All other systems reviewed and negative.        Objective   /76 (BP Cuff Size: Large adult)   Pulse 71   Temp 37.4 °C (99.4 °F)   Resp 16   Ht 1.549 m (5' 1\")   Wt 111.6 kg (246 lb)   SpO2 98%  Body mass index is 46.48 kg/m².    General: alert and oriented, pleasant, cooperative  HEENT: Normocephalic, atraumatic. Tenderness and swelling of right submandibular gland   Skin: warm and dry, no lesions or rashes  Psychiatric: appropriate mood and affect. Good insight and appropriate judgment     Assessment and Plan   The following treatment plan was discussed     1. Sialolithiasis of submandibular gland  Likely sialolithiasis of her right submandibular gland. Advised tart, hard candy to help induce salivary flow.     Healthcare maintenance     Health Maintenance Due   Topic Date Due   • HEPATITIS C SCREENING  1960   • MAMMOGRAM  2019     No follow-ups on file.    Eric Low MD  Internal Medicine  [facility]                   "

## 2019-10-01 ENCOUNTER — OFFICE VISIT (OUTPATIENT)
Dept: MEDICAL GROUP | Facility: PHYSICIAN GROUP | Age: 59
End: 2019-10-01
Payer: COMMERCIAL

## 2019-10-01 VITALS
WEIGHT: 257 LBS | OXYGEN SATURATION: 97 % | HEART RATE: 61 BPM | DIASTOLIC BLOOD PRESSURE: 70 MMHG | HEIGHT: 61 IN | BODY MASS INDEX: 48.52 KG/M2 | TEMPERATURE: 97.7 F | SYSTOLIC BLOOD PRESSURE: 134 MMHG

## 2019-10-01 DIAGNOSIS — E66.01 MORBID OBESITY WITH BMI OF 40.0-44.9, ADULT (HCC): ICD-10-CM

## 2019-10-01 DIAGNOSIS — R79.89 LOW VITAMIN D LEVEL: ICD-10-CM

## 2019-10-01 DIAGNOSIS — I10 ESSENTIAL HYPERTENSION: ICD-10-CM

## 2019-10-01 DIAGNOSIS — E78.1 HYPERTRIGLYCERIDEMIA: ICD-10-CM

## 2019-10-01 DIAGNOSIS — E28.2 PCO (POLYCYSTIC OVARIES): ICD-10-CM

## 2019-10-01 DIAGNOSIS — Z11.59 NEED FOR HEPATITIS C SCREENING TEST: ICD-10-CM

## 2019-10-01 DIAGNOSIS — F40.243 FEAR OF FLYING: ICD-10-CM

## 2019-10-01 PROBLEM — R53.82 CHRONIC FATIGUE: Status: RESOLVED | Noted: 2018-02-27 | Resolved: 2019-10-01

## 2019-10-01 PROCEDURE — 99214 OFFICE O/P EST MOD 30 MIN: CPT | Performed by: FAMILY MEDICINE

## 2019-10-01 RX ORDER — ALPRAZOLAM 0.25 MG/1
0.25 TABLET ORAL EVERY 4 HOURS PRN
COMMUNITY
End: 2021-09-08 | Stop reason: SDUPTHER

## 2019-10-01 SDOH — HEALTH STABILITY: MENTAL HEALTH: HOW MANY STANDARD DRINKS CONTAINING ALCOHOL DO YOU HAVE ON A TYPICAL DAY?: 1 OR 2

## 2019-10-01 SDOH — HEALTH STABILITY: MENTAL HEALTH: HOW OFTEN DO YOU HAVE A DRINK CONTAINING ALCOHOL?: MONTHLY OR LESS

## 2019-10-01 NOTE — PROGRESS NOTES
CC: PCOS    HISTORY OF THE PRESENT ILLNESS: Patient is a 59 y.o. female. This pleasant patient is here today to establish care and discuss health problems below.    PCOS: Chronic issue for the patient.  She is to follow with endocrinology, but actually prefers not to do so anymore as it is just another visit she feels she may not need.  She does report that her lab work suggest that she was postmenopausal at her last endocrinology visit.  She was on metformin 1500 mg daily but feels that it upsets her stomach and gives her a lot of diarrhea.  She is backed off to about 500 mg daily.  Last A1c was 5.7 in April 2019.    Anxiety/fear of flying: Patient does have some difficulty with flying and travel.  Previous PCP gave her 15 tablets of 0.25 mg of Xanax which lasts for a year.    Hypertension: Patient does have a history of hypertension.  She was on hydrochlorothiazide 25 mg daily.  Since going through a weight loss program, she has gotten off the hydrochlorothiazide.  She now only uses it as needed for edema.    Obesity: Patient as above has PCOS and difficulty losing weight.  She could did go through the [employer] program several months ago and lost 40 pounds.  Unfortunately due to stressors in life, she is gained about 20 pounds of it back.  She does have plans to get back to the gym and start eating healthier again.    Allergies: Epinephrine and Penicillins    Current Outpatient Medications Ordered in Epic   Medication Sig Dispense Refill   • metFORMIN (GLUCOPHAGE) 500 MG Tab Take 500 mg by mouth every day.     • ALPRAZolam (XANAX) 0.25 MG Tab Take 0.25 mg by mouth every four hours as needed.     • hydroCHLOROthiazide (HYDRODIURIL) 25 MG Tab Take 1 Tab by mouth every day. 90 Tab 3   • VENTOLIN  (90 Base) MCG/ACT Aero Soln inhalation aerosol Inhale 1 Puff by mouth every 6 hours as needed for Shortness of Breath. 8.5 g 3   • Fluticasone Propionate (FLONASE ALLERGY RELIEF NA) Spray  in nose.    "    No current [facility]-ordered facility-administered medications on file.        Past Medical History:   Diagnosis Date   • Exercise-induced asthma 1/15/2018   • Hypertension 1/15/2018   • PCO (polycystic ovaries) 1996       Past Surgical History:   Procedure Laterality Date   • CHOLECYSTECTOMY  2012   • CATARACT EXTRACTION WITH IOL Bilateral 1999   • TONSILLECTOMY  1989   • TUBAL COAGULATION LAPAROSCOPIC BILATERAL  1985   • ABDOMINAL HYSTERECTOMY TOTAL         Social History     Tobacco Use   • Smoking status: Never Smoker   • Smokeless tobacco: Never Used   Substance Use Topics   • Alcohol use: Yes     Frequency: Monthly or less     Drinks per session: 1 or 2     Comment: rare (holidays)    • Drug use: No       Social History     Social History Narrative            Family History   Problem Relation Age of Onset   • Other Mother         smoker   • Obesity Father        ROS:     - Constitutional: Negative for fever, chills, unexpected weight change, and fatigue/generalized weakness.     - Respiratory: Negative for cough, sputum production, chest congestion, dyspnea, wheezing, and crackles.      - Cardiovascular: Negative for chest pain, palpitations, orthopnea, PND, and bilateral lower extremity edema.     - Gastrointestinal: Negative for heartburn, nausea, vomiting, abdominal pain, hematochezia, melena, diarrhea, constipation, and greasy/foul-smelling stools.     - Genitourinary: Negative for dysuria, polyuria, hematuria, pyuria, urinary urgency, and urinary incontinence.     Exam: /70 (BP Location: Left arm, Patient Position: Sitting, BP Cuff Size: Large adult)   Pulse 61   Temp 36.5 °C (97.7 °F) (Temporal)   Ht 1.549 m (5' 1\")   Wt 116.6 kg (257 lb)   SpO2 97%  Body mass index is 48.56 kg/m².    General: Well appearing, NAD  HEENT: Normocephalic. Conjunctiva clear, lids without ptosis, pupils equal and reactive to light accommodation, ears normal shape and contour, canals are clear " bilaterally, tympanic membranes without bulging or erythema and normal cone of light, oropharynx is without erythema, edema or exudates.   Neck: Supple without JVD. No thyromegaly or nodules  Pulmonary: Clear to ausculation.  Normal effort. No rales, ronchi, or wheezing.  Cardiovascular: Regular rate and rhythm without murmur, rubs or gallop.   Abdomen: Soft, nontender, nondistended. Normal bowel sounds. Liver and spleen are not palpable. No rebound or guarding  Neurologic: normal gait  Lymph: No cervical, supraclavicular lymph nodes are palpable  Skin: Warm and dry.  No obvious lesions.  Musculoskeletal:  No extremity cyanosis, clubbing, or edema.  Psych: Normal mood and affect. Alert and oriented. Judgment and insight is normal.    Please note that this dictation was created using voice recognition software. I have made every reasonable attempt to correct obvious errors, but I expect that there are errors of grammar and possibly content that I did not discover before finalizing the note.      Assessment/Plan  Jojo was seen today for establish care and annual exam.    Diagnoses and all orders for this visit:    Essential hypertension  Chronic issue for patient's, but improved since losing weight.  Mildly elevated today.  We will continue to monitor.  Patient can continue to use hydrochlorothiazide as needed for edema.  -     Comp Metabolic Panel; Future    Need for hepatitis C screening test  -     HEP C VIRUS ANTIBODY; Future    Low vitamin D level  Chronic issue for patient.  Not currently on a vitamin D supplement.  Will check vitamin D levels.  -     VITAMIN D,25 HYDROXY; Future    Hypertriglyceridemia  Chronic issue for patient, mild.  Will check lipid profile.  -     Lipid Profile; Future    PCO (polycystic ovaries)  Chronic issue for patient, though she is postmenopausal at this point.  We will go ahead and check A1c.  Okay to continue metformin 500 mg daily.  -     HEMOGLOBIN A1C; Future    Fear of  flying  Chronic issue.  Okay to continue Xanax 0.25 mg as needed as patient only uses 15 tablets/year.    Morbid obesity with BMI of 40.0-44.9, adult (HCC)  Chronic issue for patient.  She does plan to start eating healthier and exercising again.    Follow-up in 1 year sooner if needed.    Dina Farrell, DO  [facility]

## 2019-11-25 ENCOUNTER — OFFICE VISIT (OUTPATIENT)
Dept: MEDICAL GROUP | Facility: PHYSICIAN GROUP | Age: 59
End: 2019-11-25
Payer: COMMERCIAL

## 2019-11-25 ENCOUNTER — APPOINTMENT (OUTPATIENT)
Dept: URGENT CARE | Facility: PHYSICIAN GROUP | Age: 59
End: 2019-11-25
Payer: COMMERCIAL

## 2019-11-25 ENCOUNTER — APPOINTMENT (OUTPATIENT)
Dept: RADIOLOGY | Facility: IMAGING CENTER | Age: 59
End: 2019-11-25
Attending: FAMILY MEDICINE
Payer: COMMERCIAL

## 2019-11-25 VITALS
HEART RATE: 70 BPM | SYSTOLIC BLOOD PRESSURE: 120 MMHG | WEIGHT: 257 LBS | TEMPERATURE: 98 F | BODY MASS INDEX: 48.52 KG/M2 | RESPIRATION RATE: 18 BRPM | HEIGHT: 61 IN | DIASTOLIC BLOOD PRESSURE: 80 MMHG | OXYGEN SATURATION: 97 %

## 2019-11-25 DIAGNOSIS — M54.41 CHRONIC BILATERAL LOW BACK PAIN WITH RIGHT-SIDED SCIATICA: ICD-10-CM

## 2019-11-25 DIAGNOSIS — G89.29 CHRONIC BILATERAL LOW BACK PAIN WITH RIGHT-SIDED SCIATICA: ICD-10-CM

## 2019-11-25 DIAGNOSIS — Z86.19 HISTORY OF HERPES LABIALIS: ICD-10-CM

## 2019-11-25 DIAGNOSIS — K59.09 CHRONIC CONSTIPATION: ICD-10-CM

## 2019-11-25 PROCEDURE — 99214 OFFICE O/P EST MOD 30 MIN: CPT | Performed by: FAMILY MEDICINE

## 2019-11-25 PROCEDURE — 72100 X-RAY EXAM L-S SPINE 2/3 VWS: CPT | Mod: TC | Performed by: FAMILY MEDICINE

## 2019-11-25 RX ORDER — ACYCLOVIR 400 MG/1
400 TABLET ORAL 3 TIMES DAILY
Qty: 21 TAB | Refills: 0 | Status: SHIPPED | OUTPATIENT
Start: 2019-11-25 | End: 2019-12-02

## 2019-11-25 NOTE — PATIENT INSTRUCTIONS
For constipation: I suggest a 1-2 time daily fiber supplement, such as metamucil.     When this doesn't do the trick, I suggest miralax.

## 2019-11-25 NOTE — PROGRESS NOTES
cc: Lower back pain, left labial pain      Subjective:     Jojo Gil is a 59 y.o. female presenting for the following:     About 10 days ago patient started having bad lower back pain.  She does have a history of lower back pain and she also has experienced sciatica to both legs in the past.  This did slowly worsen over the next few days until her lower back on both sides was constantly sore and she did have some radiation down the right leg.  But also, and this type same timeframe she was experiencing some pain wrapping around the right pelvis to the groin.  That has mostly improved except for she still has some professional labial pain.  She has had some herpes in the past but has been many years since she had an outbreak years.  The pain is very similar to that.  She has also had shingles in the past and does describe the pain is similar to that as well.    She does not have any difficulty with stool or urinary incontinence.  No saddle anesthesia.  No fever/chills.    Review of systems:  All others reviewed and are negative.       Current Outpatient Medications:   •  acyclovir (ZOVIRAX) 400 MG tablet, Take 1 Tab by mouth 3 times a day for 7 days., Disp: 21 Tab, Rfl: 0  •  metFORMIN (GLUCOPHAGE) 500 MG Tab, Take 500 mg by mouth every day., Disp: , Rfl:   •  ALPRAZolam (XANAX) 0.25 MG Tab, Take 0.25 mg by mouth every four hours as needed., Disp: , Rfl:   •  hydroCHLOROthiazide (HYDRODIURIL) 25 MG Tab, Take 1 Tab by mouth every day., Disp: 90 Tab, Rfl: 3  •  VENTOLIN  (90 Base) MCG/ACT Aero Soln inhalation aerosol, Inhale 1 Puff by mouth every 6 hours as needed for Shortness of Breath., Disp: 8.5 g, Rfl: 3  •  Fluticasone Propionate (FLONASE ALLERGY RELIEF NA), Spray  in nose., Disp: , Rfl:     Allergies, past medical history, past surgical history, family history, social history reviewed and updated    Objective:     Vitals: /80 (BP Location: Left arm, Patient Position: Sitting, BP Cuff Size: Large  "adult)   Pulse 70   Temp 36.7 °C (98 °F) (Temporal)   Resp 18   Ht 1.549 m (5' 1\")   Wt 116.6 kg (257 lb)   SpO2 97%   BMI 48.56 kg/m²   General: Alert, pleasant, NAD  Abdomen: Non-tender, soft  Skin: Warm, dry.  Back and groin without rashes.  Back and inguinal area not tender to light palpation.+ Tenderness on the right labia but no rash visible.  Musculoskeletal: Gait is normal.  Back without skin changes.  Tender to palpation over bilateral SI joints.  Extremities: No leg edema.    Neurological: No tremors,  CN2-12 grossly intact, patellar deep tendon reflexes 1+ bilaterally.    Assessment/Plan:     Jojo was seen today for nerve pain.    Diagnoses and all orders for this visit:    Chronic bilateral low back pain with right-sided sciatica: Chronic relapsing recurring problem.  Will obtain baseline x-ray now.  Patient encouraged to stay active and walk regularly.  If not improving in 3-4 weeks patient to let us know and will consider further imaging/referral.  Currently without red flag symptoms but patient to go to emergency room if any numbness, difficulty controlling bladder/bowel, severely worsening pain.  -     DX-LUMBAR SPINE-2 OR 3 VIEWS; Future    History of herpes labialis: Although no rash visible pain is very similar to herpes outbreaks patient has had in the past so will trial course of acyclovir.  -     acyclovir (ZOVIRAX) 400 MG tablet; Take 1 Tab by mouth 3 times a day for 7 days.    Chronic constipation-patient does mention today that she is had difficulty with chronic constipation and also recurrent hemorrhoids for many years.  No change in this recently, no melena, weight loss.  Suggest a daily fiber supplement such as Metamucil and MiraLAX as needed.    Return if symptoms worsen or fail to improve.  "

## 2019-11-27 ENCOUNTER — HOSPITAL ENCOUNTER (OUTPATIENT)
Dept: LAB | Facility: MEDICAL CENTER | Age: 59
End: 2019-11-27
Attending: FAMILY MEDICINE
Payer: COMMERCIAL

## 2019-11-27 DIAGNOSIS — Z00.00 ANNUAL PHYSICAL EXAM: ICD-10-CM

## 2019-11-27 DIAGNOSIS — I10 ESSENTIAL HYPERTENSION: ICD-10-CM

## 2019-11-27 DIAGNOSIS — Z11.59 NEED FOR HEPATITIS C SCREENING TEST: ICD-10-CM

## 2019-11-27 DIAGNOSIS — E28.2 PCO (POLYCYSTIC OVARIES): ICD-10-CM

## 2019-11-27 DIAGNOSIS — E78.1 HYPERTRIGLYCERIDEMIA: ICD-10-CM

## 2019-11-27 DIAGNOSIS — R79.89 LOW VITAMIN D LEVEL: ICD-10-CM

## 2019-11-27 LAB
25(OH)D3 SERPL-MCNC: 11 NG/ML (ref 30–100)
ALBUMIN SERPL BCP-MCNC: 4 G/DL (ref 3.2–4.9)
ALBUMIN/GLOB SERPL: 1.3 G/DL
ALP SERPL-CCNC: 49 U/L (ref 30–99)
ALT SERPL-CCNC: 15 U/L (ref 2–50)
ANION GAP SERPL CALC-SCNC: 8 MMOL/L (ref 0–11.9)
AST SERPL-CCNC: 13 U/L (ref 12–45)
BILIRUB SERPL-MCNC: 0.4 MG/DL (ref 0.1–1.5)
BUN SERPL-MCNC: 21 MG/DL (ref 8–22)
CALCIUM SERPL-MCNC: 8.9 MG/DL (ref 8.5–10.5)
CHLORIDE SERPL-SCNC: 106 MMOL/L (ref 96–112)
CHOLEST SERPL-MCNC: 176 MG/DL (ref 100–199)
CO2 SERPL-SCNC: 25 MMOL/L (ref 20–33)
CREAT SERPL-MCNC: 0.67 MG/DL (ref 0.5–1.4)
EST. AVERAGE GLUCOSE BLD GHB EST-MCNC: 117 MG/DL
FASTING STATUS PATIENT QL REPORTED: NORMAL
GLOBULIN SER CALC-MCNC: 3 G/DL (ref 1.9–3.5)
GLUCOSE SERPL-MCNC: 92 MG/DL (ref 65–99)
HBA1C MFR BLD: 5.7 % (ref 0–5.6)
HCV AB SER QL: NEGATIVE
HDLC SERPL-MCNC: 43 MG/DL
LDLC SERPL CALC-MCNC: 100 MG/DL
POTASSIUM SERPL-SCNC: 4.1 MMOL/L (ref 3.6–5.5)
PROT SERPL-MCNC: 7 G/DL (ref 6–8.2)
SODIUM SERPL-SCNC: 139 MMOL/L (ref 135–145)
TRIGL SERPL-MCNC: 166 MG/DL (ref 0–149)

## 2019-11-27 PROCEDURE — 80061 LIPID PANEL: CPT

## 2019-11-27 PROCEDURE — 86803 HEPATITIS C AB TEST: CPT

## 2019-11-27 PROCEDURE — 80053 COMPREHEN METABOLIC PANEL: CPT

## 2019-11-27 PROCEDURE — 36415 COLL VENOUS BLD VENIPUNCTURE: CPT

## 2019-11-27 PROCEDURE — 83036 HEMOGLOBIN GLYCOSYLATED A1C: CPT

## 2019-11-27 PROCEDURE — 82306 VITAMIN D 25 HYDROXY: CPT

## 2019-12-02 RX ORDER — ERGOCALCIFEROL 1.25 MG/1
50000 CAPSULE ORAL
Qty: 12 CAP | Refills: 0 | Status: SHIPPED | OUTPATIENT
Start: 2019-12-02 | End: 2020-09-05

## 2019-12-24 ENCOUNTER — HOSPITAL ENCOUNTER (OUTPATIENT)
Dept: RADIOLOGY | Facility: MEDICAL CENTER | Age: 59
End: 2019-12-24
Attending: INTERNAL MEDICINE
Payer: COMMERCIAL

## 2019-12-24 DIAGNOSIS — Z12.39 SCREENING FOR BREAST CANCER: ICD-10-CM

## 2019-12-24 PROCEDURE — 77063 BREAST TOMOSYNTHESIS BI: CPT

## 2020-09-05 ENCOUNTER — HOSPITAL ENCOUNTER (OUTPATIENT)
Dept: RADIOLOGY | Facility: MEDICAL CENTER | Age: 60
End: 2020-09-05
Attending: PHYSICIAN ASSISTANT
Payer: COMMERCIAL

## 2020-09-05 ENCOUNTER — OFFICE VISIT (OUTPATIENT)
Dept: URGENT CARE | Facility: PHYSICIAN GROUP | Age: 60
End: 2020-09-05
Payer: COMMERCIAL

## 2020-09-05 ENCOUNTER — HOSPITAL ENCOUNTER (OUTPATIENT)
Dept: LAB | Facility: MEDICAL CENTER | Age: 60
End: 2020-09-05
Attending: PHYSICIAN ASSISTANT
Payer: COMMERCIAL

## 2020-09-05 VITALS
DIASTOLIC BLOOD PRESSURE: 78 MMHG | SYSTOLIC BLOOD PRESSURE: 142 MMHG | TEMPERATURE: 98.6 F | HEIGHT: 61 IN | OXYGEN SATURATION: 95 % | RESPIRATION RATE: 20 BRPM | BODY MASS INDEX: 50.3 KG/M2 | WEIGHT: 266.4 LBS | HEART RATE: 89 BPM

## 2020-09-05 DIAGNOSIS — K57.92 DIVERTICULITIS: Primary | ICD-10-CM

## 2020-09-05 DIAGNOSIS — R10.32 LEFT LOWER QUADRANT ABDOMINAL PAIN: ICD-10-CM

## 2020-09-05 LAB
ALBUMIN SERPL BCP-MCNC: 4.3 G/DL (ref 3.2–4.9)
ALBUMIN/GLOB SERPL: 1.4 G/DL
ALP SERPL-CCNC: 70 U/L (ref 30–99)
ALT SERPL-CCNC: 17 U/L (ref 2–50)
ANION GAP SERPL CALC-SCNC: 14 MMOL/L (ref 7–16)
AST SERPL-CCNC: 8 U/L (ref 12–45)
BASOPHILS # BLD AUTO: 0.4 % (ref 0–1.8)
BASOPHILS # BLD: 0.04 K/UL (ref 0–0.12)
BILIRUB SERPL-MCNC: 0.6 MG/DL (ref 0.1–1.5)
BUN SERPL-MCNC: 12 MG/DL (ref 8–22)
CALCIUM SERPL-MCNC: 9.4 MG/DL (ref 8.5–10.5)
CHLORIDE SERPL-SCNC: 102 MMOL/L (ref 96–112)
CO2 SERPL-SCNC: 21 MMOL/L (ref 20–33)
CREAT SERPL-MCNC: 0.66 MG/DL (ref 0.5–1.4)
EOSINOPHIL # BLD AUTO: 0.05 K/UL (ref 0–0.51)
EOSINOPHIL NFR BLD: 0.4 % (ref 0–6.9)
ERYTHROCYTE [DISTWIDTH] IN BLOOD BY AUTOMATED COUNT: 44.5 FL (ref 35.9–50)
FASTING STATUS PATIENT QL REPORTED: NORMAL
GLOBULIN SER CALC-MCNC: 3.1 G/DL (ref 1.9–3.5)
GLUCOSE SERPL-MCNC: 112 MG/DL (ref 65–99)
HCT VFR BLD AUTO: 44.2 % (ref 37–47)
HGB BLD-MCNC: 14.5 G/DL (ref 12–16)
IMM GRANULOCYTES # BLD AUTO: 0.05 K/UL (ref 0–0.11)
IMM GRANULOCYTES NFR BLD AUTO: 0.4 % (ref 0–0.9)
LYMPHOCYTES # BLD AUTO: 1.77 K/UL (ref 1–4.8)
LYMPHOCYTES NFR BLD: 15.9 % (ref 22–41)
MCH RBC QN AUTO: 28.8 PG (ref 27–33)
MCHC RBC AUTO-ENTMCNC: 32.8 G/DL (ref 33.6–35)
MCV RBC AUTO: 87.9 FL (ref 81.4–97.8)
MONOCYTES # BLD AUTO: 0.54 K/UL (ref 0–0.85)
MONOCYTES NFR BLD AUTO: 4.9 % (ref 0–13.4)
NEUTROPHILS # BLD AUTO: 8.67 K/UL (ref 2–7.15)
NEUTROPHILS NFR BLD: 78 % (ref 44–72)
NRBC # BLD AUTO: 0 K/UL
NRBC BLD-RTO: 0 /100 WBC
PLATELET # BLD AUTO: 240 K/UL (ref 164–446)
PMV BLD AUTO: 11.4 FL (ref 9–12.9)
POTASSIUM SERPL-SCNC: 4 MMOL/L (ref 3.6–5.5)
PROT SERPL-MCNC: 7.4 G/DL (ref 6–8.2)
RBC # BLD AUTO: 5.03 M/UL (ref 4.2–5.4)
SODIUM SERPL-SCNC: 137 MMOL/L (ref 135–145)
WBC # BLD AUTO: 11.1 K/UL (ref 4.8–10.8)

## 2020-09-05 PROCEDURE — 80053 COMPREHEN METABOLIC PANEL: CPT

## 2020-09-05 PROCEDURE — 36415 COLL VENOUS BLD VENIPUNCTURE: CPT

## 2020-09-05 PROCEDURE — 74177 CT ABD & PELVIS W/CONTRAST: CPT

## 2020-09-05 PROCEDURE — 99214 OFFICE O/P EST MOD 30 MIN: CPT | Performed by: PHYSICIAN ASSISTANT

## 2020-09-05 PROCEDURE — 700117 HCHG RX CONTRAST REV CODE 255: Performed by: PHYSICIAN ASSISTANT

## 2020-09-05 PROCEDURE — 85025 COMPLETE CBC W/AUTO DIFF WBC: CPT

## 2020-09-05 RX ORDER — CIPROFLOXACIN 500 MG/1
500 TABLET, FILM COATED ORAL 2 TIMES DAILY
Qty: 14 TAB | Refills: 0 | Status: SHIPPED | OUTPATIENT
Start: 2020-09-05 | End: 2020-09-07 | Stop reason: SINTOL

## 2020-09-05 RX ORDER — METRONIDAZOLE 500 MG/1
500 TABLET ORAL 3 TIMES DAILY
Qty: 21 TAB | Refills: 0 | Status: SHIPPED | OUTPATIENT
Start: 2020-09-05 | End: 2020-09-08

## 2020-09-05 RX ADMIN — IOHEXOL 100 ML: 350 INJECTION, SOLUTION INTRAVENOUS at 17:12

## 2020-09-05 RX ADMIN — IOHEXOL 25 ML: 240 INJECTION, SOLUTION INTRATHECAL; INTRAVASCULAR; INTRAVENOUS; ORAL at 17:12

## 2020-09-05 ASSESSMENT — PAIN SCALES - GENERAL: PAINLEVEL: 6=MODERATE PAIN

## 2020-09-05 ASSESSMENT — ENCOUNTER SYMPTOMS
BLOOD IN STOOL: 0
COUGH: 0
ABDOMINAL PAIN: 1
VOMITING: 0
FLATUS: 0
BELCHING: 1
CHILLS: 0
FEVER: 1
DIARRHEA: 0
NAUSEA: 1
SHORTNESS OF BREATH: 0
CONSTIPATION: 1

## 2020-09-05 ASSESSMENT — CROHNS DISEASE ACTIVITY INDEX (CDAI): CDAI SCORE: 0

## 2020-09-05 NOTE — PROGRESS NOTES
Subjective:   Jojo Gil is a 59 y.o. female who presents for Abdominal Pain (LLQ x1 days )        Abdominal Pain  This is a new problem. Episode onset: 2 days. The onset quality is gradual. The problem occurs intermittently. The pain is located in the LLQ. The quality of the pain is cramping. The abdominal pain radiates to the suprapubic region. Associated symptoms include belching, constipation, a fever (100.3 F ), frequency and nausea. Pertinent negatives include no diarrhea, dysuria, flatus, hematuria, melena or vomiting. The pain is aggravated by palpation. The pain is relieved by nothing. Treatments tried: peptobismol, water, tylenol  Her past medical history is significant for abdominal surgery (hysterecectomy 2010, cholecystectomy 2012 ) and gallstones. There is no history of Crohn's disease, irritable bowel syndrome, pancreatitis or ulcerative colitis.     Last BM: this morning, loose, small, no blood in stool.     Colonoscopy: diverticulosis 8 years ago     Review of Systems   Constitutional: Positive for fever (100.3 F ). Negative for chills and malaise/fatigue.   Respiratory: Negative for cough and shortness of breath.    Gastrointestinal: Positive for abdominal pain, constipation and nausea. Negative for blood in stool, diarrhea, flatus, melena and vomiting.   Genitourinary: Positive for frequency. Negative for dysuria, hematuria and urgency.   All other systems reviewed and are negative.      PMH:  has a past medical history of Exercise-induced asthma (1/15/2018), Hypertension (1/15/2018), and PCO (polycystic ovaries) (1996).  MEDS:   Current Outpatient Medications:   •  ciprofloxacin (CIPRO) 500 MG Tab, Take 1 Tab by mouth 2 times a day for 7 days., Disp: 14 Tab, Rfl: 0  •  metroNIDAZOLE (FLAGYL) 500 MG Tab, Take 1 Tab by mouth 3 times a day for 7 days., Disp: 21 Tab, Rfl: 0  •  ALPRAZolam (XANAX) 0.25 MG Tab, Take 0.25 mg by mouth every four hours as needed., Disp: , Rfl:   •  hydroCHLOROthiazide  "(HYDRODIURIL) 25 MG Tab, Take 1 Tab by mouth every day., Disp: 90 Tab, Rfl: 3  •  VENTOLIN  (90 Base) MCG/ACT Aero Soln inhalation aerosol, Inhale 1 Puff by mouth every 6 hours as needed for Shortness of Breath., Disp: 8.5 g, Rfl: 3  •  Fluticasone Propionate (FLONASE ALLERGY RELIEF NA), Spray  in nose., Disp: , Rfl:   ALLERGIES:   Allergies   Allergen Reactions   • Epinephrine      Highly sensitive    • Penicillins      SURGHX:   Past Surgical History:   Procedure Laterality Date   • CHOLECYSTECTOMY  2012   • CATARACT EXTRACTION WITH IOL Bilateral 1999   • TONSILLECTOMY  1989   • TUBAL COAGULATION LAPAROSCOPIC BILATERAL  1985   • ABDOMINAL HYSTERECTOMY TOTAL       SOCHX:  reports that she has never smoked. She has never used smokeless tobacco. She reports current alcohol use. She reports that she does not use drugs.  Family History   Problem Relation Age of Onset   • Other Mother         smoker   • Obesity Father         Objective:   /78 (BP Location: Left arm, Patient Position: Sitting, BP Cuff Size: Adult)   Pulse 89   Temp 37 °C (98.6 °F) (Temporal)   Resp 20   Ht 1.549 m (5' 1\")   Wt 120.8 kg (266 lb 6.4 oz)   SpO2 95%   BMI 50.34 kg/m²     Physical Exam  Vitals signs reviewed.   Constitutional:       General: She is not in acute distress.     Appearance: She is well-developed.   HENT:      Head: Normocephalic and atraumatic.      Right Ear: External ear normal.      Left Ear: External ear normal.      Nose: Nose normal.      Mouth/Throat:      Mouth: Mucous membranes are moist.   Eyes:      Conjunctiva/sclera: Conjunctivae normal.      Pupils: Pupils are equal, round, and reactive to light.   Neck:      Musculoskeletal: Normal range of motion and neck supple.      Trachea: No tracheal deviation.   Cardiovascular:      Rate and Rhythm: Normal rate and regular rhythm.   Pulmonary:      Effort: Pulmonary effort is normal. No respiratory distress.      Breath sounds: Normal breath sounds. No " wheezing.   Abdominal:      Palpations: Abdomen is soft.      Tenderness: There is abdominal tenderness in the left lower quadrant. There is no right CVA tenderness, left CVA tenderness, guarding or rebound. Negative signs include McBurney's sign.   Skin:     General: Skin is warm and dry.      Capillary Refill: Capillary refill takes less than 2 seconds.   Neurological:      General: No focal deficit present.      Mental Status: She is alert and oriented to person, place, and time.   Psychiatric:         Mood and Affect: Mood normal.         Behavior: Behavior normal.        CT IMPRESSION:     1.  Wall thickening and pericolonic inflammatory change is consistent with acute diverticulitis.     2.  Small hiatal hernia.     3.  Sclerosis.      Assessment/Plan:     1. Diverticulitis  ciprofloxacin (CIPRO) 500 MG Tab    metroNIDAZOLE (FLAGYL) 500 MG Tab    REFERRAL TO FOLLOW-UP WITH PRIMARY CARE   2. Left lower quadrant abdominal pain  CBC WITH DIFFERENTIAL    Comp Metabolic Panel    CT-ABDOMEN-PELVIS WITH     Supportive care reviewed.  Continue to monitor symptoms and temperature closely.  An urgent referral was placed to follow-up with primary care within 7 days. If symptoms worsen or persist patient can return to clinic for reevaluation.  Red flags and strict emergency room precautions discussed. All side effects of medication discussed including allergic response, GI upset, tendon injury, etc. Patient confirmed understanding of information.    Please note that this dictation was created using voice recognition software. I have made every reasonable attempt to correct obvious errors, but I expect that there are errors of grammar and possibly content that I did not discover before finalizing the note.

## 2020-09-06 ENCOUNTER — TELEPHONE (OUTPATIENT)
Dept: URGENT CARE | Facility: PHYSICIAN GROUP | Age: 60
End: 2020-09-06

## 2020-09-07 DIAGNOSIS — K57.92 DIVERTICULITIS: ICD-10-CM

## 2020-09-07 RX ORDER — SULFAMETHOXAZOLE AND TRIMETHOPRIM 800; 160 MG/1; MG/1
1 TABLET ORAL 2 TIMES DAILY
Qty: 14 TAB | Refills: 0 | Status: SHIPPED | OUTPATIENT
Start: 2020-09-07 | End: 2020-09-08

## 2020-09-07 NOTE — PROGRESS NOTES
Spoke with pt, she will d/c ciprofloxacin. Start bactrim and continue metronidazole with food, avoid alcohol. She does have a follow up schedule with pcp tomorrow. Red flags and ER precautions discussed.

## 2020-09-08 ENCOUNTER — OFFICE VISIT (OUTPATIENT)
Dept: MEDICAL GROUP | Facility: PHYSICIAN GROUP | Age: 60
End: 2020-09-08
Payer: COMMERCIAL

## 2020-09-08 VITALS
OXYGEN SATURATION: 96 % | DIASTOLIC BLOOD PRESSURE: 66 MMHG | HEART RATE: 66 BPM | SYSTOLIC BLOOD PRESSURE: 120 MMHG | BODY MASS INDEX: 49.47 KG/M2 | TEMPERATURE: 98.5 F | HEIGHT: 61 IN | WEIGHT: 262 LBS

## 2020-09-08 DIAGNOSIS — R79.89 LOW VITAMIN D LEVEL: ICD-10-CM

## 2020-09-08 DIAGNOSIS — T88.7XXA MEDICATION SIDE EFFECT: ICD-10-CM

## 2020-09-08 DIAGNOSIS — K57.92 DIVERTICULITIS: ICD-10-CM

## 2020-09-08 DIAGNOSIS — E78.1 HYPERTRIGLYCERIDEMIA: ICD-10-CM

## 2020-09-08 DIAGNOSIS — R73.03 PREDIABETES: ICD-10-CM

## 2020-09-08 DIAGNOSIS — E55.9 VITAMIN D DEFICIENCY: ICD-10-CM

## 2020-09-08 DIAGNOSIS — I10 ESSENTIAL HYPERTENSION: ICD-10-CM

## 2020-09-08 PROCEDURE — 99214 OFFICE O/P EST MOD 30 MIN: CPT | Performed by: FAMILY MEDICINE

## 2020-09-08 RX ORDER — MOXIFLOXACIN HYDROCHLORIDE 400 MG/1
400 TABLET ORAL DAILY
Qty: 7 TAB | Refills: 0 | Status: SHIPPED | OUTPATIENT
Start: 2020-09-08 | End: 2020-09-15

## 2020-09-08 RX ORDER — ONDANSETRON 4 MG/1
4 TABLET, FILM COATED ORAL EVERY 4 HOURS PRN
Qty: 30 TAB | Refills: 0 | Status: SHIPPED | OUTPATIENT
Start: 2020-09-08 | End: 2021-06-24

## 2020-09-08 ASSESSMENT — FIBROSIS 4 INDEX: FIB4 SCORE: 0.47

## 2020-09-08 ASSESSMENT — PATIENT HEALTH QUESTIONNAIRE - PHQ9: CLINICAL INTERPRETATION OF PHQ2 SCORE: 0

## 2020-09-09 NOTE — PROGRESS NOTES
CC: Diverticulitis    HISTORY OF THE PRESENT ILLNESS: Patient is a 59 y.o. female. This pleasant patient is here today for follow-up.    Patient is here today for follow-up.  She was recently seen in urgent care on 5 September for left lower quadrant pain.  She was diagnosed with diverticulitis, confirmed abdominal CT.  She also had mild leukocytosis.  Patient was prescribed Cipro and Flagyl.  She unfortunately developed severe vertigo and vomiting.  As a result she was switched to Bactrim and Flagyl.  She notes that the vertigo has resolved as has the vomiting and nausea.  However, she is only taking the Bactrim and not the Flagyl.  States she is too scared to restart the Flagyl.  She continues to have some left lower quadrant pain though is feeling overall quite a lot better than when she was originally seen in urgent care.  She denies any fevers, chills, diarrhea, blood in the stools.    She is no longer taking hydrochlorothiazide.  She was previously taking it for hypertension but did lose weight with Dr. Nguyen.  She was told that she could get off the hydrochlorothiazide.  Unfortunately she gained the weight back but blood pressure is remained fairly good.  She would like to continue home monitoring.    Also due for some additional lab work including vitamin D, A1c and lipid profile.    Allergies: Ciprofloxacin, Epinephrine, and Penicillins    Current Outpatient Medications Ordered in Epic   Medication Sig Dispense Refill   • moxifloxacin (AVELOX) 400 MG Tab Take 1 Tab by mouth every day for 7 days. 7 Tab 0   • ondansetron (ZOFRAN) 4 MG Tab tablet Take 1 Tab by mouth every four hours as needed for Nausea/Vomiting. 30 Tab 0   • ALPRAZolam (XANAX) 0.25 MG Tab Take 0.25 mg by mouth every four hours as needed.     • VENTOLIN  (90 Base) MCG/ACT Aero Soln inhalation aerosol Inhale 1 Puff by mouth every 6 hours as needed for Shortness of Breath. 8.5 g 3   • Fluticasone Propionate (FLONASE ALLERGY RELIEF  "NA) Spray  in nose.       No current [facility]-ordered facility-administered medications on file.        Past Medical History:   Diagnosis Date   • Exercise-induced asthma 1/15/2018   • Hypertension 1/15/2018   • PCO (polycystic ovaries) 1996       Past Surgical History:   Procedure Laterality Date   • CHOLECYSTECTOMY  2012   • CATARACT EXTRACTION WITH IOL Bilateral 1999   • TONSILLECTOMY  1989   • TUBAL COAGULATION LAPAROSCOPIC BILATERAL  1985   • ABDOMINAL HYSTERECTOMY TOTAL         Social History     Tobacco Use   • Smoking status: Never Smoker   • Smokeless tobacco: Never Used   Substance Use Topics   • Alcohol use: Yes     Frequency: Monthly or less     Drinks per session: 1 or 2     Comment: rare (holidays)    • Drug use: No       Social History     Social History Narrative            Family History   Problem Relation Age of Onset   • Other Mother         smoker   • Obesity Father        ROS:     - Constitutional: Negative for fever, chills, unexpected weight change, and fatigue/generalized weakness.     - Respiratory: Negative for cough, sputum production, chest congestion, dyspnea, wheezing, and crackles.      - Cardiovascular: Negative for chest pain, palpitations, orthopnea, PND, and bilateral lower extremity edema.     Labs: Labs reviewed from September 5, 2020.    Exam: /66 (BP Location: Right arm, Patient Position: Sitting, BP Cuff Size: Large adult)   Pulse 66   Temp 36.9 °C (98.5 °F) (Temporal)   Ht 1.549 m (5' 1\")   Wt 118.8 kg (262 lb)   SpO2 96%  Body mass index is 49.5 kg/m².    General: Well appearing, NAD  Pulmonary: Clear to ausculation.  Normal effort. No rales, ronchi, or wheezing.  Cardiovascular: Regular rate and rhythm without murmur, rubs or gallop.   Abdomen: Moderate left lower quadrant tenderness to palpation noted.  Soft,  nondistended. Normal bowel sounds. Liver and spleen are not palpable. No rebound or guarding  Skin: Warm and dry.  No obvious " lesions.  Musculoskeletal:  No extremity cyanosis, clubbing, or edema.  Psych: Normal mood and affect. Alert and oriented. Judgment and insight is normal.    Please note that this dictation was created using voice recognition software. I have made every reasonable attempt to correct obvious errors, but I expect that there are errors of grammar and possibly content that I did not discover before finalizing the note.      Assessment/Plan  Jojo was seen today for follow-up.    Diagnoses and all orders for this visit:    Diverticulitis  New diagnosis for the patient confirmed on CT scan.  Unfortunately at this time I feel we are inadequately treating her diverticulitis as she refuses to take the Flagyl.  She is still having some left lower quadrant tenderness.  She wants to continue only Bactrim.  Unfortunately I discussed at length with the patient that the only monotherapy for diverticulitis is either moxifloxacin or Augmentin.  She has a penicillin allergy so we are essentially going to need to do moxifloxacin if she is unwilling to take the Bactrim and the Flagyl.  Ultimately, she does agree to this.  I did prescribe her some Zofran as she has highly fearful of developing nausea and vomiting again.  She can take the Zofran with the moxifloxacin and hopefully this will help.  I did discuss dangers of inadequate treatment of diverticulitis including intra-abdominal abscess, bowel perforation, sepsis.  -     moxifloxacin (AVELOX) 400 MG Tab; Take 1 Tab by mouth every day for 7 days.    Medication side effect  -     ondansetron (ZOFRAN) 4 MG Tab tablet; Take 1 Tab by mouth every four hours as needed for Nausea/Vomiting.    Essential hypertension  Chronic issue, well controlled off the hydrochlorothiazide.  Recommend home ambulatory monitoring.    Hypertriglyceridemia  Chronic issue, not currently on medication.  Recommend recheck at this time.  -     Lipid Profile; Future    Vitamin D deficiency  Chronic, recheck at  this time.  -     VITAMIN D,25 HYDROXY; Future    Prediabetes  Chronic issue, stable.  Previously on metformin, but is no longer on this medication.  Recheck A1c.  -     HEMOGLOBIN A1C; Future      Follow-up in 1 year sooner if symptoms not improving.    Dina Farrell,   [facility]

## 2020-09-25 DIAGNOSIS — Z00.00 ANNUAL PHYSICAL EXAM: ICD-10-CM

## 2020-10-02 RX ORDER — ALBUTEROL SULFATE 90 UG/1
1 AEROSOL, METERED RESPIRATORY (INHALATION) EVERY 6 HOURS PRN
Qty: 18 G | Refills: 1 | Status: SHIPPED | OUTPATIENT
Start: 2020-10-02 | End: 2021-06-24

## 2021-03-15 DIAGNOSIS — Z23 NEED FOR VACCINATION: ICD-10-CM

## 2021-03-19 ENCOUNTER — IMMUNIZATION (OUTPATIENT)
Dept: FAMILY PLANNING/WOMEN'S HEALTH CLINIC | Facility: IMMUNIZATION CENTER | Age: 61
End: 2021-03-19
Attending: INTERNAL MEDICINE
Payer: COMMERCIAL

## 2021-03-19 DIAGNOSIS — Z23 NEED FOR VACCINATION: ICD-10-CM

## 2021-03-19 DIAGNOSIS — Z23 ENCOUNTER FOR VACCINATION: Primary | ICD-10-CM

## 2021-03-19 PROCEDURE — 91301 MODERNA SARS-COV-2 VACCINE: CPT

## 2021-03-19 PROCEDURE — 0011A MODERNA SARS-COV-2 VACCINE: CPT

## 2021-04-17 ENCOUNTER — IMMUNIZATION (OUTPATIENT)
Dept: FAMILY PLANNING/WOMEN'S HEALTH CLINIC | Facility: IMMUNIZATION CENTER | Age: 61
End: 2021-04-17
Attending: INTERNAL MEDICINE
Payer: COMMERCIAL

## 2021-04-17 DIAGNOSIS — Z23 ENCOUNTER FOR VACCINATION: Primary | ICD-10-CM

## 2021-04-17 PROCEDURE — 91301 MODERNA SARS-COV-2 VACCINE: CPT

## 2021-04-17 PROCEDURE — 0012A MODERNA SARS-COV-2 VACCINE: CPT

## 2021-05-12 ENCOUNTER — APPOINTMENT (RX ONLY)
Dept: URBAN - METROPOLITAN AREA CLINIC 4 | Facility: CLINIC | Age: 61
Setting detail: DERMATOLOGY
End: 2021-05-12

## 2021-05-12 DIAGNOSIS — Z85.828 PERSONAL HISTORY OF OTHER MALIGNANT NEOPLASM OF SKIN: ICD-10-CM

## 2021-05-12 DIAGNOSIS — L57.0 ACTINIC KERATOSIS: ICD-10-CM

## 2021-05-12 DIAGNOSIS — L82.0 INFLAMED SEBORRHEIC KERATOSIS: ICD-10-CM

## 2021-05-12 PROCEDURE — ? LIQUID NITROGEN (COSMETIC)

## 2021-05-12 PROCEDURE — ? OBSERVATION

## 2021-05-12 PROCEDURE — ? LIQUID NITROGEN

## 2021-05-12 PROCEDURE — 99202 OFFICE O/P NEW SF 15 MIN: CPT | Mod: 25

## 2021-05-12 PROCEDURE — 17000 DESTRUCT PREMALG LESION: CPT

## 2021-05-12 ASSESSMENT — LOCATION SIMPLE DESCRIPTION DERM
LOCATION SIMPLE: LEFT UPPER LIP
LOCATION SIMPLE: UPPER LIP
LOCATION SIMPLE: RIGHT THIGH

## 2021-05-12 ASSESSMENT — LOCATION ZONE DERM
LOCATION ZONE: LEG
LOCATION ZONE: LIP

## 2021-05-12 ASSESSMENT — LOCATION DETAILED DESCRIPTION DERM
LOCATION DETAILED: RIGHT ANTERIOR PROXIMAL THIGH
LOCATION DETAILED: PHILTRUM
LOCATION DETAILED: LEFT SUPERIOR VERMILION BORDER

## 2021-05-12 NOTE — PROCEDURE: LIQUID NITROGEN (COSMETIC)
Price (Use Numbers Only, No Special Characters Or $): 0
Detail Level: Detailed
Billing Information: Bill by Static Price
Post-Care Instructions: I reviewed with the patient in detail post-care instructions. Patient is to wear sunprotection, and avoid picking at any of the treated lesions. Pt may apply Vaseline to crusted or scabbing areas.
Render Post-Care Instructions In Note?: no
Consent: The patient's consent was obtained including but not limited to risks of crusting, scabbing, blistering, scarring, darker or lighter pigmentary change, recurrence, incomplete removal and infection. The patient understands that the procedure is cosmetic in nature and is not covered by insurance.

## 2021-05-12 NOTE — PROCEDURE: LIQUID NITROGEN
Render Note In Bullet Format When Appropriate: No
Post-Care Instructions: I reviewed with the patient in detail post-care instructions. Patient is to wear sunprotection, and avoid picking at any of the treated lesions. Pt may apply Vaseline to crusted or scabbing areas.
Detail Level: Detailed
Consent: The patient's consent was obtained including but not limited to risks of crusting, scabbing, blistering, scarring, darker or lighter pigmentary change, recurrence, incomplete removal and infection.
Duration Of Freeze Thaw-Cycle (Seconds): 5
Number Of Freeze-Thaw Cycles: 1 freeze-thaw cycle

## 2021-06-24 ENCOUNTER — OFFICE VISIT (OUTPATIENT)
Dept: MEDICAL GROUP | Facility: PHYSICIAN GROUP | Age: 61
End: 2021-06-24
Payer: COMMERCIAL

## 2021-06-24 ENCOUNTER — HOSPITAL ENCOUNTER (OUTPATIENT)
Dept: LAB | Facility: MEDICAL CENTER | Age: 61
End: 2021-06-24
Attending: NURSE PRACTITIONER
Payer: COMMERCIAL

## 2021-06-24 VITALS
HEIGHT: 61 IN | RESPIRATION RATE: 16 BRPM | TEMPERATURE: 98.6 F | OXYGEN SATURATION: 96 % | DIASTOLIC BLOOD PRESSURE: 72 MMHG | SYSTOLIC BLOOD PRESSURE: 150 MMHG | HEART RATE: 89 BPM | WEIGHT: 263 LBS | BODY MASS INDEX: 49.65 KG/M2

## 2021-06-24 DIAGNOSIS — R10.32 LEFT LOWER QUADRANT ABDOMINAL PAIN: ICD-10-CM

## 2021-06-24 DIAGNOSIS — K57.92 DIVERTICULITIS: ICD-10-CM

## 2021-06-24 LAB
BASOPHILS # BLD AUTO: 0.4 % (ref 0–1.8)
BASOPHILS # BLD: 0.04 K/UL (ref 0–0.12)
EOSINOPHIL # BLD AUTO: 0.14 K/UL (ref 0–0.51)
EOSINOPHIL NFR BLD: 1.3 % (ref 0–6.9)
ERYTHROCYTE [DISTWIDTH] IN BLOOD BY AUTOMATED COUNT: 45.1 FL (ref 35.9–50)
HCT VFR BLD AUTO: 45.1 % (ref 37–47)
HGB BLD-MCNC: 14.4 G/DL (ref 12–16)
IMM GRANULOCYTES # BLD AUTO: 0.03 K/UL (ref 0–0.11)
IMM GRANULOCYTES NFR BLD AUTO: 0.3 % (ref 0–0.9)
LYMPHOCYTES # BLD AUTO: 1.68 K/UL (ref 1–4.8)
LYMPHOCYTES NFR BLD: 15.1 % (ref 22–41)
MCH RBC QN AUTO: 28.5 PG (ref 27–33)
MCHC RBC AUTO-ENTMCNC: 31.9 G/DL (ref 33.6–35)
MCV RBC AUTO: 89.1 FL (ref 81.4–97.8)
MONOCYTES # BLD AUTO: 0.66 K/UL (ref 0–0.85)
MONOCYTES NFR BLD AUTO: 5.9 % (ref 0–13.4)
NEUTROPHILS # BLD AUTO: 8.56 K/UL (ref 2–7.15)
NEUTROPHILS NFR BLD: 77 % (ref 44–72)
NRBC # BLD AUTO: 0 K/UL
NRBC BLD-RTO: 0 /100 WBC
PLATELET # BLD AUTO: 252 K/UL (ref 164–446)
PMV BLD AUTO: 11.5 FL (ref 9–12.9)
RBC # BLD AUTO: 5.06 M/UL (ref 4.2–5.4)
WBC # BLD AUTO: 11.1 K/UL (ref 4.8–10.8)

## 2021-06-24 PROCEDURE — 36415 COLL VENOUS BLD VENIPUNCTURE: CPT

## 2021-06-24 PROCEDURE — 85025 COMPLETE CBC W/AUTO DIFF WBC: CPT

## 2021-06-24 PROCEDURE — 99215 OFFICE O/P EST HI 40 MIN: CPT | Performed by: NURSE PRACTITIONER

## 2021-06-24 RX ORDER — MOXIFLOXACIN HYDROCHLORIDE 400 MG/1
400 TABLET ORAL DAILY
Qty: 10 TABLET | Refills: 0 | Status: SHIPPED | OUTPATIENT
Start: 2021-06-24 | End: 2021-09-08

## 2021-06-24 ASSESSMENT — PAIN SCALES - GENERAL: PAINLEVEL: 6=MODERATE PAIN

## 2021-06-24 ASSESSMENT — PATIENT HEALTH QUESTIONNAIRE - PHQ9: CLINICAL INTERPRETATION OF PHQ2 SCORE: 0

## 2021-06-24 ASSESSMENT — FIBROSIS 4 INDEX: FIB4 SCORE: 0.48

## 2021-06-24 NOTE — PROGRESS NOTES
"      CC: Abd pain x5 days                                                                                                                                      HPI:   Jojo presents today with the following.    Left lower quadrant abdominal pain  Pt has had abd pain for 5 days. She does have a history of diverticulitis. She also reports pain with bearing down, increased gas, and loose stool today. She denies blood in her stool or diarrhea. She does report a low grade fever of 100.5F yesterday or Tuesday.       Current Outpatient Medications   Medication Sig Dispense Refill   • moxifloxacin (AVELOX) 400 MG Tab Take 1 tablet by mouth every day. 10 tablet 0   • ALPRAZolam (XANAX) 0.25 MG Tab Take 0.25 mg by mouth every four hours as needed.     • Fluticasone Propionate (FLONASE ALLERGY RELIEF NA) Spray  in nose.       No current facility-administered medications for this visit.       Allergies as of 06/24/2021 - Reviewed 06/24/2021   Allergen Reaction Noted   • Ciprofloxacin Nausea 09/08/2020   • Epinephrine  01/15/2018   • Penicillins  01/15/2018        ROS:  Gen: no fevers/chills, no changes in weight  Pulm: no sob, no cough  CV: no chest pain, no palpitations  GI: + nausea, no vomiting, no diarrhea  Neuro: no headaches, no numbness/tingling    /72 (BP Location: Right arm, Patient Position: Sitting, BP Cuff Size: Large adult)   Pulse 89   Temp 37 °C (98.6 °F) (Temporal)   Resp 16   Ht 1.549 m (5' 1\")   Wt 119 kg (263 lb)   SpO2 96%   BMI 49.69 kg/m²     Physical Exam:  Gen:         Alert and oriented, No apparent distress.  Neck:        No Lymphadenopathy.   Lungs:     Clear to auscultation bilaterally. No wheezes, rales, or rhonchi.   CV:          Regular rate and rhythm. No murmurs, rubs or gallops.    GI: Patient's abdomen is semifirm and rounded, patient has tenderness to palpation in right lower quadrant, left lower quadrant, and periumbilical area.  Patient denies tenderness to right upper " quadrant and left upper quadrant.  Bowel sounds are normoactive in all 4 quadrants.       Ext:          No clubbing, cyanosis, or peripheral edema.  Skin:  All visible skin intact without lesions.       Assessment and Plan:  60 y.o. female with the following issues.    1. Diverticulitis  - moxifloxacin (AVELOX) 400 MG Tab; Take 1 tablet by mouth every day.  Dispense: 10 tablet; Refill: 0  - CBC WITH DIFFERENTIAL; Future  - Basic Metabolic Panel; Future    2. Left lower quadrant abdominal pain  - CBC WITH DIFFERENTIAL; Future  - Basic Metabolic Panel; Future    This is most likely a acute exacerbation of patient's diverticulitis.  Other differentials considered are appendicitis, UTI, renal calculi, and or peritonitis.  Patient will be placed on oral antibiotic therapy.  Patient provided with strict ER precautions.  Patient is to rule present to ER if she develops high fever, if her abdominal pain does not improve with antibiotics, and or if her symptoms change and or worsen.  Patient verbalized understanding.      Return if symptoms worsen or fail to improve.    I have placed the below orders and discussed them with an approved delegating provider.  The MA is performing the below orders under the direction of Dr. Bullard.    Please note that this dictation was created using voice recognition software. I have worked with consultants from the vendor as well as technical experts from [facility] to optimize the interface. I have made every reasonable attempt to correct obvious errors, but I expect that there are errors of grammar and possibly content that I did not discover before finalizing the note.

## 2021-06-24 NOTE — ASSESSMENT & PLAN NOTE
Pt has had abd pain for 5 days. She does have a history of diverticulitis. She also reports pain with bearing down, increased gas, and loose stool today. She denies blood in her stool or diarrhea. She does report a low grade fever of 100.5F yesterday or Tuesday.

## 2021-06-24 NOTE — PATIENT INSTRUCTIONS
Diverticulitis    Diverticulitis is infection or inflammation of small pouches (diverticula) in the colon that form due to a condition called diverticulosis. Diverticula can trap stool (feces) and bacteria, causing infection and inflammation.  Diverticulitis may cause severe stomach pain and diarrhea. It may lead to tissue damage in the colon that causes bleeding. The diverticula may also burst (rupture) and cause infected stool to enter other areas of the abdomen.  Complications of diverticulitis can include:  · Bleeding.  · Severe infection.  · Severe pain.  · Rupture (perforation) of the colon.  · Blockage (obstruction) of the colon.  What are the causes?  This condition is caused by stool becoming trapped in the diverticula, which allows bacteria to grow in the diverticula. This leads to inflammation and infection.  What increases the risk?  You are more likely to develop this condition if:  · You have diverticulosis. The risk for diverticulosis increases if:  ? You are overweight or obese.  ? You use tobacco products.  ? You do not get enough exercise.  · You eat a diet that does not include enough fiber. High-fiber foods include fruits, vegetables, beans, nuts, and whole grains.  What are the signs or symptoms?  Symptoms of this condition may include:  · Pain and tenderness in the abdomen. The pain is normally located on the left side of the abdomen, but it may occur in other areas.  · Fever and chills.  · Bloating.  · Cramping.  · Nausea.  · Vomiting.  · Changes in bowel routines.  · Blood in your stool.  How is this diagnosed?  This condition is diagnosed based on:  · Your medical history.  · A physical exam.  · Tests to make sure there is nothing else causing your condition. These tests may include:  ? Blood tests.  ? Urine tests.  ? Imaging tests of the abdomen, including X-rays, ultrasounds, MRIs, or CT scans.  How is this treated?  Most cases of this condition are mild and can be treated at home.  Treatment may include:  · Taking over-the-counter pain medicines.  · Following a clear liquid diet.  · Taking antibiotic medicines by mouth.  · Rest.  More severe cases may need to be treated at a hospital. Treatment may include:  · Not eating or drinking.  · Taking prescription pain medicine.  · Receiving antibiotic medicines through an IV tube.  · Receiving fluids and nutrition through an IV tube.  · Surgery.  When your condition is under control, your health care provider may recommend that you have a colonoscopy. This is an exam to look at the entire large intestine. During the exam, a lubricated, bendable tube is inserted into the anus and then passed into the rectum, colon, and other parts of the large intestine. A colonoscopy can show how severe your diverticula are and whether something else may be causing your symptoms.  Follow these instructions at home:  Medicines  · Take over-the-counter and prescription medicines only as told by your health care provider. These include fiber supplements, probiotics, and stool softeners.  · If you were prescribed an antibiotic medicine, take it as told by your health care provider. Do not stop taking the antibiotic even if you start to feel better.  · Do not drive or use heavy machinery while taking prescription pain medicine.  General instructions    · Follow a full liquid diet or another diet as directed by your health care provider. After your symptoms improve, your health care provider may tell you to change your diet. He or she may recommend that you eat a diet that contains at least 25 g (25 grams) of fiber daily. Fiber makes it easier to pass stool. Healthy sources of fiber include:  ? Berries. One cup contains 4-8 grams of fiber.  ? Beans or lentils. One half cup contains 5-8 grams of fiber.  ? Green vegetables. One cup contains 4 grams of fiber.  · Exercise for at least 30 minutes, 3 times each week. You should exercise hard enough to raise your heart rate and  break a sweat.  · Keep all follow-up visits as told by your health care provider. This is important. You may need a colonoscopy.  Contact a health care provider if:  · Your pain does not improve.  · You have a hard time drinking or eating food.  · Your bowel movements do not return to normal.  Get help right away if:  · Your pain gets worse.  · Your symptoms do not get better with treatment.  · Your symptoms suddenly get worse.  · You have a fever.  · You vomit more than one time.  · You have stools that are bloody, black, or tarry.  Summary  · Diverticulitis is infection or inflammation of small pouches (diverticula) in the colon that form due to a condition called diverticulosis. Diverticula can trap stool (feces) and bacteria, causing infection and inflammation.  · You are at higher risk for this condition if you have diverticulosis and you eat a diet that does not include enough fiber.  · Most cases of this condition are mild and can be treated at home. More severe cases may need to be treated at a hospital.  · When your condition is under control, your health care provider may recommend that you have an exam called a colonoscopy. This exam can show how severe your diverticula are and whether something else may be causing your symptoms.  This information is not intended to replace advice given to you by your health care provider. Make sure you discuss any questions you have with your health care provider.  Document Released: 09/27/2006 Document Revised: 11/30/2018 Document Reviewed: 01/20/2018  [employer] Patient Education © 2020 [employer]

## 2021-07-14 VITALS — SYSTOLIC BLOOD PRESSURE: 149 MMHG | DIASTOLIC BLOOD PRESSURE: 70 MMHG

## 2021-07-29 VITALS — SYSTOLIC BLOOD PRESSURE: 128 MMHG | DIASTOLIC BLOOD PRESSURE: 79 MMHG

## 2021-09-08 ENCOUNTER — OFFICE VISIT (OUTPATIENT)
Dept: MEDICAL GROUP | Facility: PHYSICIAN GROUP | Age: 61
End: 2021-09-08
Payer: COMMERCIAL

## 2021-09-08 VITALS
HEIGHT: 61 IN | SYSTOLIC BLOOD PRESSURE: 126 MMHG | TEMPERATURE: 97.7 F | HEART RATE: 71 BPM | BODY MASS INDEX: 49.09 KG/M2 | WEIGHT: 260 LBS | DIASTOLIC BLOOD PRESSURE: 72 MMHG | OXYGEN SATURATION: 97 %

## 2021-09-08 DIAGNOSIS — F41.9 ANXIETY: ICD-10-CM

## 2021-09-08 DIAGNOSIS — Z12.31 ENCOUNTER FOR SCREENING MAMMOGRAM FOR BREAST CANCER: ICD-10-CM

## 2021-09-08 DIAGNOSIS — H55.00 NYSTAGMUS: ICD-10-CM

## 2021-09-08 DIAGNOSIS — K57.92 DIVERTICULITIS: ICD-10-CM

## 2021-09-08 DIAGNOSIS — H53.9 VISION CHANGES: ICD-10-CM

## 2021-09-08 DIAGNOSIS — E55.9 VITAMIN D DEFICIENCY: ICD-10-CM

## 2021-09-08 DIAGNOSIS — Z00.00 PREVENTATIVE HEALTH CARE: ICD-10-CM

## 2021-09-08 DIAGNOSIS — I10 ESSENTIAL HYPERTENSION: ICD-10-CM

## 2021-09-08 DIAGNOSIS — E78.1 HYPERTRIGLYCERIDEMIA: ICD-10-CM

## 2021-09-08 PROCEDURE — 99214 OFFICE O/P EST MOD 30 MIN: CPT | Performed by: FAMILY MEDICINE

## 2021-09-08 RX ORDER — ALPRAZOLAM 0.25 MG/1
.125-.25 TABLET ORAL
Qty: 15 TABLET | Refills: 0 | Status: SHIPPED | OUTPATIENT
Start: 2021-09-08 | End: 2021-10-08

## 2021-09-08 SDOH — HEALTH STABILITY: PHYSICAL HEALTH: ON AVERAGE, HOW MANY MINUTES DO YOU ENGAGE IN EXERCISE AT THIS LEVEL?: 30 MIN

## 2021-09-08 SDOH — HEALTH STABILITY: PHYSICAL HEALTH: ON AVERAGE, HOW MANY DAYS PER WEEK DO YOU ENGAGE IN MODERATE TO STRENUOUS EXERCISE (LIKE A BRISK WALK)?: 2 DAYS

## 2021-09-08 SDOH — ECONOMIC STABILITY: TRANSPORTATION INSECURITY
IN THE PAST 12 MONTHS, HAS LACK OF RELIABLE TRANSPORTATION KEPT YOU FROM MEDICAL APPOINTMENTS, MEETINGS, WORK OR FROM GETTING THINGS NEEDED FOR DAILY LIVING?: NO

## 2021-09-08 SDOH — ECONOMIC STABILITY: TRANSPORTATION INSECURITY
IN THE PAST 12 MONTHS, HAS LACK OF TRANSPORTATION KEPT YOU FROM MEETINGS, WORK, OR FROM GETTING THINGS NEEDED FOR DAILY LIVING?: NO

## 2021-09-08 SDOH — ECONOMIC STABILITY: INCOME INSECURITY: IN THE LAST 12 MONTHS, WAS THERE A TIME WHEN YOU WERE NOT ABLE TO PAY THE MORTGAGE OR RENT ON TIME?: NO

## 2021-09-08 SDOH — ECONOMIC STABILITY: TRANSPORTATION INSECURITY
IN THE PAST 12 MONTHS, HAS THE LACK OF TRANSPORTATION KEPT YOU FROM MEDICAL APPOINTMENTS OR FROM GETTING MEDICATIONS?: NO

## 2021-09-08 SDOH — HEALTH STABILITY: MENTAL HEALTH
STRESS IS WHEN SOMEONE FEELS TENSE, NERVOUS, ANXIOUS, OR CAN'T SLEEP AT NIGHT BECAUSE THEIR MIND IS TROUBLED. HOW STRESSED ARE YOU?: TO SOME EXTENT

## 2021-09-08 SDOH — ECONOMIC STABILITY: HOUSING INSECURITY
IN THE LAST 12 MONTHS, WAS THERE A TIME WHEN YOU DID NOT HAVE A STEADY PLACE TO SLEEP OR SLEPT IN A SHELTER (INCLUDING NOW)?: NO

## 2021-09-08 SDOH — ECONOMIC STABILITY: FOOD INSECURITY: WITHIN THE PAST 12 MONTHS, THE FOOD YOU BOUGHT JUST DIDN'T LAST AND YOU DIDN'T HAVE MONEY TO GET MORE.: NEVER TRUE

## 2021-09-08 SDOH — ECONOMIC STABILITY: HOUSING INSECURITY: IN THE LAST 12 MONTHS, HOW MANY PLACES HAVE YOU LIVED?: 1

## 2021-09-08 SDOH — ECONOMIC STABILITY: FOOD INSECURITY: WITHIN THE PAST 12 MONTHS, YOU WORRIED THAT YOUR FOOD WOULD RUN OUT BEFORE YOU GOT MONEY TO BUY MORE.: NEVER TRUE

## 2021-09-08 SDOH — ECONOMIC STABILITY: INCOME INSECURITY: HOW HARD IS IT FOR YOU TO PAY FOR THE VERY BASICS LIKE FOOD, HOUSING, MEDICAL CARE, AND HEATING?: NOT HARD AT ALL

## 2021-09-08 ASSESSMENT — SOCIAL DETERMINANTS OF HEALTH (SDOH)
DO YOU BELONG TO ANY CLUBS OR ORGANIZATIONS SUCH AS CHURCH GROUPS UNIONS, FRATERNAL OR ATHLETIC GROUPS, OR SCHOOL GROUPS?: NO
HOW OFTEN DO YOU ATTENT MEETINGS OF THE CLUB OR ORGANIZATION YOU BELONG TO?: NEVER
DO YOU BELONG TO ANY CLUBS OR ORGANIZATIONS SUCH AS CHURCH GROUPS UNIONS, FRATERNAL OR ATHLETIC GROUPS, OR SCHOOL GROUPS?: NO
HOW OFTEN DO YOU ATTENT MEETINGS OF THE CLUB OR ORGANIZATION YOU BELONG TO?: NEVER
IN A TYPICAL WEEK, HOW MANY TIMES DO YOU TALK ON THE PHONE WITH FAMILY, FRIENDS, OR NEIGHBORS?: MORE THAN THREE TIMES A WEEK
HOW MANY DRINKS CONTAINING ALCOHOL DO YOU HAVE ON A TYPICAL DAY WHEN YOU ARE DRINKING: 1 OR 2
HOW OFTEN DO YOU HAVE A DRINK CONTAINING ALCOHOL: MONTHLY OR LESS
HOW OFTEN DO YOU GET TOGETHER WITH FRIENDS OR RELATIVES?: NEVER
HOW OFTEN DO YOU ATTEND CHURCH OR RELIGIOUS SERVICES?: NEVER
HOW OFTEN DO YOU HAVE SIX OR MORE DRINKS ON ONE OCCASION: NEVER
HOW HARD IS IT FOR YOU TO PAY FOR THE VERY BASICS LIKE FOOD, HOUSING, MEDICAL CARE, AND HEATING?: NOT HARD AT ALL
HOW OFTEN DO YOU ATTEND CHURCH OR RELIGIOUS SERVICES?: NEVER
IN A TYPICAL WEEK, HOW MANY TIMES DO YOU TALK ON THE PHONE WITH FAMILY, FRIENDS, OR NEIGHBORS?: MORE THAN THREE TIMES A WEEK
WITHIN THE PAST 12 MONTHS, YOU WORRIED THAT YOUR FOOD WOULD RUN OUT BEFORE YOU GOT THE MONEY TO BUY MORE: NEVER TRUE
HOW OFTEN DO YOU GET TOGETHER WITH FRIENDS OR RELATIVES?: NEVER

## 2021-09-08 ASSESSMENT — LIFESTYLE VARIABLES
HOW MANY STANDARD DRINKS CONTAINING ALCOHOL DO YOU HAVE ON A TYPICAL DAY: 1 OR 2
HOW OFTEN DO YOU HAVE SIX OR MORE DRINKS ON ONE OCCASION: NEVER
HOW OFTEN DO YOU HAVE A DRINK CONTAINING ALCOHOL: MONTHLY OR LESS

## 2021-09-08 ASSESSMENT — FIBROSIS 4 INDEX: FIB4 SCORE: 0.45

## 2021-09-09 NOTE — PROGRESS NOTES
"CC: Diverticulitis, vision changes    HISTORY OF THE PRESENT ILLNESS: Patient is a 60 y.o. female.     Patient is here today to follow-up on chronic health conditions including hypertension, hypercholesterolemia, anxiety and diverticulitis.  She also has new concern of recent vision changes and nystagmus.    Allergies: Ciprofloxacin, Epinephrine, and Penicillins    Current Outpatient Medications Ordered in Epic   Medication Sig Dispense Refill   • ALPRAZolam (XANAX) 0.25 MG Tab Take 0.5-1 Tablets by mouth 1 time a day as needed for Anxiety for up to 30 days. 15 Tablet 0   • hydroCHLOROthiazide (HYDRODIURIL) 25 MG Tab Take 1 tablet by mouth every day. 90 tablet 0     No current Epic-ordered facility-administered medications on file.       Past Medical History:   Diagnosis Date   • Exercise-induced asthma 1/15/2018   • Hypertension 1/15/2018   • PCO (polycystic ovaries) 1996       Past Surgical History:   Procedure Laterality Date   • CHOLECYSTECTOMY  2012   • CATARACT EXTRACTION WITH IOL Bilateral 1999   • TONSILLECTOMY  1989   • TUBAL COAGULATION LAPAROSCOPIC BILATERAL  1985   • ABDOMINAL HYSTERECTOMY TOTAL         Social History     Tobacco Use   • Smoking status: Never Smoker   • Smokeless tobacco: Never Used   Vaping Use   • Vaping Use: Never used   Substance Use Topics   • Alcohol use: Yes     Comment: rare (holidays)    • Drug use: No       Social History     Social History Narrative            Family History   Problem Relation Age of Onset   • Other Mother         smoker   • Obesity Father        Exam: /72 (BP Location: Right arm, Patient Position: Sitting, BP Cuff Size: Adult)   Pulse 71   Temp 36.5 °C (97.7 °F) (Temporal)   Ht 1.549 m (5' 1\")   Wt 118 kg (260 lb)   SpO2 97%  Body mass index is 49.13 kg/m².    General: Well appearing, NAD  HEENT: Normocephalic. Conjunctiva clear, lids without ptosis, pupils equal and reactive to light accommodation, ears normal shape and contour, " canals are clear bilaterally, tympanic membranes without bulging or erythema and normal cone of light  Neck: Supple without JVD. No thyromegaly or nodules  Pulmonary: Clear to ausculation.  Normal effort. No rales, ronchi, or wheezing.  Cardiovascular: Regular rate and rhythm without murmur, rubs or gallop.   Abdomen: Soft, nontender, nondistended. Normal bowel sounds. Liver and spleen are not palpable. No rebound or guarding  Neurologic: normal gait  Lymph: No cervical, supraclavicular lymph nodes are palpable  Skin: Warm and dry.  No obvious lesions.  Musculoskeletal:  No extremity cyanosis, clubbing, or edema.  Psych: Normal mood and affect. Alert and oriented. Judgment and insight is normal.    Please note that this dictation was created using voice recognition software. I have made every reasonable attempt to correct obvious errors, but I expect that there are errors of grammar and possibly content that I did not discover before finalizing the note.      Assessment/Plan  Jojo was seen today for medication refill and migraine.    Diagnoses and all orders for this visit:    Diverticulitis  Patient has had recurrent diverticulitis, not currently though.  She is also almost due for a colonoscopy in about 4 months.  She has been referred to gastroenterology and plans to pursue this soon.  She has intolerances or allergies to most of the antibiotics used to treat diverticulitis so she is hoping to discuss what she can do to better prevent episodes of diverticulitis.  In general high-fiber diet and avoidance of constipation is recommended.    Anxiety   This is a chronic issue for the patient, mostly with fear of flying or driving.  She uses this medication extremely rarely as last prescription has long .  New prescription provided today.  -     ALPRAZolam (XANAX) 0.25 MG Tab; Take 0.5-1 Tablets by mouth 1 time a day as needed for Anxiety for up to 30 days.    Obtained and reviewed patient utilization report  from [facility] pharmacy database on 9/8/2021 5:50 PM  prior to writing prescription for controlled substance II, III or IV per [place] bill . Based on assessment of the report,my physical exam if necessary and the patient's health problem, the prescription is medically necessary.     Encounter for screening mammogram for breast cancer  -     MA-SCREENING MAMMO BILAT W/TOMOSYNTHESIS W/CAD; Future    Vitamin D deficiency  Significant deficiency noted on most recent lab work, with levels at 11.  Recommend recheck at this time.  -     VITAMIN D,25 HYDROXY; Future    Essential hypertension  This is a chronic issue for which she takes hydrochlorothiazide.  Blood pressures well controlled at today's visit.  She is due for metabolic panel.  -     Comp Metabolic Panel; Future    Hypertriglyceridemia  Noted on previous lab work, but not currently on medications.  Do recommend recheck at this time.  -     Lipid Profile; Future    Preventative health care  -     VITAMIN D,25 HYDROXY; Future  -     Lipid Profile; Future  -     CBC WITH DIFFERENTIAL; Future  -     TSH WITH REFLEX TO FT4; Future  -     Comp Metabolic Panel; Future    Nystagmus  Vision changes  Patient notes that she recently has been in an office setting where she has fluorescent lighting, fluorescent computer screens.  She notes that this seems to be triggering a sensation of feeling woozy, and at times light seem to move back and forth.  She has been seen by optometrist who told her that she had nystagmus, followed by ophthalmologist who stated that nothing was structurally or physically wrong with the eye but that she could be having ocular migraines.  She notes that she has been wearing a baseball cap and has turned her device is on nighttime mode.  This seems to have helped the sensation considerably.  She would like to monitor at this time.  Certainly if symptoms are progressive or worsening she agrees to contact me and I would recommend a neurology  referral.    Follow-up in 6 to 12 months or sooner if needed.    Dina Farrell DO  [facility]

## 2021-10-12 ENCOUNTER — HOSPITAL ENCOUNTER (OUTPATIENT)
Dept: LAB | Facility: MEDICAL CENTER | Age: 61
End: 2021-10-12
Attending: FAMILY MEDICINE
Payer: COMMERCIAL

## 2021-10-12 DIAGNOSIS — I10 ESSENTIAL HYPERTENSION: ICD-10-CM

## 2021-10-12 DIAGNOSIS — Z00.00 PREVENTATIVE HEALTH CARE: ICD-10-CM

## 2021-10-12 DIAGNOSIS — E78.1 HYPERTRIGLYCERIDEMIA: ICD-10-CM

## 2021-10-12 DIAGNOSIS — E55.9 VITAMIN D DEFICIENCY: ICD-10-CM

## 2021-10-12 LAB
25(OH)D3 SERPL-MCNC: 20 NG/ML (ref 30–100)
ALBUMIN SERPL BCP-MCNC: 4.1 G/DL (ref 3.2–4.9)
ALBUMIN/GLOB SERPL: 1.4 G/DL
ALP SERPL-CCNC: 58 U/L (ref 30–99)
ALT SERPL-CCNC: 16 U/L (ref 2–50)
ANION GAP SERPL CALC-SCNC: 14 MMOL/L (ref 7–16)
AST SERPL-CCNC: 16 U/L (ref 12–45)
BASOPHILS # BLD AUTO: 1 % (ref 0–1.8)
BASOPHILS # BLD: 0.06 K/UL (ref 0–0.12)
BILIRUB SERPL-MCNC: 0.3 MG/DL (ref 0.1–1.5)
BUN SERPL-MCNC: 15 MG/DL (ref 8–22)
CALCIUM SERPL-MCNC: 9.1 MG/DL (ref 8.5–10.5)
CHLORIDE SERPL-SCNC: 105 MMOL/L (ref 96–112)
CHOLEST SERPL-MCNC: 200 MG/DL (ref 100–199)
CO2 SERPL-SCNC: 21 MMOL/L (ref 20–33)
CREAT SERPL-MCNC: 0.73 MG/DL (ref 0.5–1.4)
EOSINOPHIL # BLD AUTO: 0.17 K/UL (ref 0–0.51)
EOSINOPHIL NFR BLD: 3 % (ref 0–6.9)
ERYTHROCYTE [DISTWIDTH] IN BLOOD BY AUTOMATED COUNT: 45.8 FL (ref 35.9–50)
GLOBULIN SER CALC-MCNC: 3 G/DL (ref 1.9–3.5)
GLUCOSE SERPL-MCNC: 99 MG/DL (ref 65–99)
HCT VFR BLD AUTO: 44.4 % (ref 37–47)
HDLC SERPL-MCNC: 44 MG/DL
HGB BLD-MCNC: 14.5 G/DL (ref 12–16)
IMM GRANULOCYTES # BLD AUTO: 0.01 K/UL (ref 0–0.11)
IMM GRANULOCYTES NFR BLD AUTO: 0.2 % (ref 0–0.9)
LDLC SERPL CALC-MCNC: 119 MG/DL
LYMPHOCYTES # BLD AUTO: 2.18 K/UL (ref 1–4.8)
LYMPHOCYTES NFR BLD: 38 % (ref 22–41)
MCH RBC QN AUTO: 28.8 PG (ref 27–33)
MCHC RBC AUTO-ENTMCNC: 32.7 G/DL (ref 33.6–35)
MCV RBC AUTO: 88.1 FL (ref 81.4–97.8)
MONOCYTES # BLD AUTO: 0.31 K/UL (ref 0–0.85)
MONOCYTES NFR BLD AUTO: 5.4 % (ref 0–13.4)
NEUTROPHILS # BLD AUTO: 3.01 K/UL (ref 2–7.15)
NEUTROPHILS NFR BLD: 52.4 % (ref 44–72)
NRBC # BLD AUTO: 0 K/UL
NRBC BLD-RTO: 0 /100 WBC
PLATELET # BLD AUTO: 240 K/UL (ref 164–446)
PMV BLD AUTO: 11.5 FL (ref 9–12.9)
POTASSIUM SERPL-SCNC: 4.1 MMOL/L (ref 3.6–5.5)
PROT SERPL-MCNC: 7.1 G/DL (ref 6–8.2)
RBC # BLD AUTO: 5.04 M/UL (ref 4.2–5.4)
SODIUM SERPL-SCNC: 140 MMOL/L (ref 135–145)
TRIGL SERPL-MCNC: 184 MG/DL (ref 0–149)
TSH SERPL DL<=0.005 MIU/L-ACNC: 1.7 UIU/ML (ref 0.38–5.33)
WBC # BLD AUTO: 5.7 K/UL (ref 4.8–10.8)

## 2021-10-12 PROCEDURE — 85025 COMPLETE CBC W/AUTO DIFF WBC: CPT

## 2021-10-12 PROCEDURE — 82306 VITAMIN D 25 HYDROXY: CPT

## 2021-10-12 PROCEDURE — 80053 COMPREHEN METABOLIC PANEL: CPT

## 2021-10-12 PROCEDURE — 36415 COLL VENOUS BLD VENIPUNCTURE: CPT

## 2021-10-12 PROCEDURE — 84443 ASSAY THYROID STIM HORMONE: CPT

## 2021-10-12 PROCEDURE — 80061 LIPID PANEL: CPT

## 2021-10-13 DIAGNOSIS — E55.9 VITAMIN D DEFICIENCY: ICD-10-CM

## 2021-10-13 RX ORDER — ERGOCALCIFEROL 1.25 MG/1
50000 CAPSULE ORAL
Qty: 12 CAPSULE | Refills: 0 | Status: SHIPPED | OUTPATIENT
Start: 2021-10-13

## 2021-11-01 ENCOUNTER — HOSPITAL ENCOUNTER (OUTPATIENT)
Dept: RADIOLOGY | Facility: MEDICAL CENTER | Age: 61
End: 2021-11-01
Attending: FAMILY MEDICINE
Payer: COMMERCIAL

## 2021-11-01 DIAGNOSIS — Z12.31 ENCOUNTER FOR SCREENING MAMMOGRAM FOR BREAST CANCER: ICD-10-CM

## 2021-11-01 PROCEDURE — 77063 BREAST TOMOSYNTHESIS BI: CPT

## 2022-03-15 ENCOUNTER — HOSPITAL ENCOUNTER (OUTPATIENT)
Dept: LAB | Facility: MEDICAL CENTER | Age: 62
End: 2022-03-15
Attending: FAMILY MEDICINE
Payer: COMMERCIAL

## 2022-03-15 LAB
25(OH)D3 SERPL-MCNC: 27 NG/ML (ref 30–100)
ALBUMIN SERPL BCP-MCNC: 4.1 G/DL (ref 3.2–4.9)
ALBUMIN/GLOB SERPL: 1.7 G/DL
ALP SERPL-CCNC: 58 U/L (ref 30–99)
ALT SERPL-CCNC: 17 U/L (ref 2–50)
ANION GAP SERPL CALC-SCNC: 14 MMOL/L (ref 7–16)
AST SERPL-CCNC: 18 U/L (ref 12–45)
BASOPHILS # BLD AUTO: 1 % (ref 0–1.8)
BASOPHILS # BLD: 0.05 K/UL (ref 0–0.12)
BILIRUB SERPL-MCNC: 0.3 MG/DL (ref 0.1–1.5)
BUN SERPL-MCNC: 15 MG/DL (ref 8–22)
CALCIUM SERPL-MCNC: 9.2 MG/DL (ref 8.5–10.5)
CHLORIDE SERPL-SCNC: 105 MMOL/L (ref 96–112)
CHOLEST SERPL-MCNC: 164 MG/DL (ref 100–199)
CO2 SERPL-SCNC: 22 MMOL/L (ref 20–33)
CREAT SERPL-MCNC: 0.66 MG/DL (ref 0.5–1.4)
EOSINOPHIL # BLD AUTO: 0.14 K/UL (ref 0–0.51)
EOSINOPHIL NFR BLD: 2.8 % (ref 0–6.9)
ERYTHROCYTE [DISTWIDTH] IN BLOOD BY AUTOMATED COUNT: 46.1 FL (ref 35.9–50)
EST. AVERAGE GLUCOSE BLD GHB EST-MCNC: 117 MG/DL
FASTING STATUS PATIENT QL REPORTED: NORMAL
GFR SERPLBLD CREATININE-BSD FMLA CKD-EPI: 100 ML/MIN/1.73 M 2
GLOBULIN SER CALC-MCNC: 2.4 G/DL (ref 1.9–3.5)
GLUCOSE SERPL-MCNC: 105 MG/DL (ref 65–99)
HBA1C MFR BLD: 5.7 % (ref 4–5.6)
HCT VFR BLD AUTO: 43.5 % (ref 37–47)
HDLC SERPL-MCNC: 46 MG/DL
HGB BLD-MCNC: 14.5 G/DL (ref 12–16)
IMM GRANULOCYTES # BLD AUTO: 0.01 K/UL (ref 0–0.11)
IMM GRANULOCYTES NFR BLD AUTO: 0.2 % (ref 0–0.9)
LDLC SERPL CALC-MCNC: 85 MG/DL
LYMPHOCYTES # BLD AUTO: 1.8 K/UL (ref 1–4.8)
LYMPHOCYTES NFR BLD: 36.1 % (ref 22–41)
MCH RBC QN AUTO: 29.1 PG (ref 27–33)
MCHC RBC AUTO-ENTMCNC: 33.3 G/DL (ref 33.6–35)
MCV RBC AUTO: 87.3 FL (ref 81.4–97.8)
MONOCYTES # BLD AUTO: 0.27 K/UL (ref 0–0.85)
MONOCYTES NFR BLD AUTO: 5.4 % (ref 0–13.4)
NEUTROPHILS # BLD AUTO: 2.71 K/UL (ref 2–7.15)
NEUTROPHILS NFR BLD: 54.5 % (ref 44–72)
NRBC # BLD AUTO: 0 K/UL
NRBC BLD-RTO: 0 /100 WBC
PLATELET # BLD AUTO: 219 K/UL (ref 164–446)
PMV BLD AUTO: 11.4 FL (ref 9–12.9)
POTASSIUM SERPL-SCNC: 4.6 MMOL/L (ref 3.6–5.5)
PROT SERPL-MCNC: 6.5 G/DL (ref 6–8.2)
RBC # BLD AUTO: 4.98 M/UL (ref 4.2–5.4)
SODIUM SERPL-SCNC: 141 MMOL/L (ref 135–145)
TRIGL SERPL-MCNC: 164 MG/DL (ref 0–149)
TSH SERPL DL<=0.005 MIU/L-ACNC: 1.96 UIU/ML (ref 0.38–5.33)
WBC # BLD AUTO: 5 K/UL (ref 4.8–10.8)

## 2022-03-15 PROCEDURE — 80061 LIPID PANEL: CPT

## 2022-03-15 PROCEDURE — 36415 COLL VENOUS BLD VENIPUNCTURE: CPT

## 2022-03-15 PROCEDURE — 83036 HEMOGLOBIN GLYCOSYLATED A1C: CPT

## 2022-03-15 PROCEDURE — 80053 COMPREHEN METABOLIC PANEL: CPT

## 2022-03-15 PROCEDURE — 82306 VITAMIN D 25 HYDROXY: CPT

## 2022-03-15 PROCEDURE — 84443 ASSAY THYROID STIM HORMONE: CPT

## 2022-03-15 PROCEDURE — 85025 COMPLETE CBC W/AUTO DIFF WBC: CPT

## 2024-06-05 ENCOUNTER — HOSPITAL ENCOUNTER (OUTPATIENT)
Dept: LAB | Facility: MEDICAL CENTER | Age: 64
End: 2024-06-05
Attending: FAMILY MEDICINE
Payer: COMMERCIAL

## 2024-06-05 LAB
25(OH)D3 SERPL-MCNC: 23 NG/ML (ref 30–100)
ALBUMIN SERPL BCP-MCNC: 4.1 G/DL (ref 3.2–4.9)
ALBUMIN/GLOB SERPL: 1.5 G/DL
ALP SERPL-CCNC: 55 U/L (ref 30–99)
ALT SERPL-CCNC: 18 U/L (ref 2–50)
ANION GAP SERPL CALC-SCNC: 13 MMOL/L (ref 7–16)
AST SERPL-CCNC: 19 U/L (ref 12–45)
BASOPHILS # BLD AUTO: 0.9 % (ref 0–1.8)
BASOPHILS # BLD: 0.05 K/UL (ref 0–0.12)
BILIRUB SERPL-MCNC: 0.4 MG/DL (ref 0.1–1.5)
BUN SERPL-MCNC: 18 MG/DL (ref 8–22)
CALCIUM ALBUM COR SERPL-MCNC: 9.1 MG/DL (ref 8.5–10.5)
CALCIUM SERPL-MCNC: 9.2 MG/DL (ref 8.5–10.5)
CHLORIDE SERPL-SCNC: 104 MMOL/L (ref 96–112)
CHOLEST SERPL-MCNC: 178 MG/DL (ref 100–199)
CO2 SERPL-SCNC: 22 MMOL/L (ref 20–33)
CREAT SERPL-MCNC: 0.72 MG/DL (ref 0.5–1.4)
CREAT UR-MCNC: 134.23 MG/DL
EOSINOPHIL # BLD AUTO: 0.17 K/UL (ref 0–0.51)
EOSINOPHIL NFR BLD: 3.2 % (ref 0–6.9)
ERYTHROCYTE [DISTWIDTH] IN BLOOD BY AUTOMATED COUNT: 45.6 FL (ref 35.9–50)
EST. AVERAGE GLUCOSE BLD GHB EST-MCNC: 123 MG/DL
GFR SERPLBLD CREATININE-BSD FMLA CKD-EPI: 94 ML/MIN/1.73 M 2
GLOBULIN SER CALC-MCNC: 2.7 G/DL (ref 1.9–3.5)
GLUCOSE SERPL-MCNC: 102 MG/DL (ref 65–99)
HBA1C MFR BLD: 5.9 % (ref 4–5.6)
HCT VFR BLD AUTO: 43.7 % (ref 37–47)
HDLC SERPL-MCNC: 44 MG/DL
HGB BLD-MCNC: 14.5 G/DL (ref 12–16)
IMM GRANULOCYTES # BLD AUTO: 0.02 K/UL (ref 0–0.11)
IMM GRANULOCYTES NFR BLD AUTO: 0.4 % (ref 0–0.9)
LDLC SERPL CALC-MCNC: 101 MG/DL
LYMPHOCYTES # BLD AUTO: 1.86 K/UL (ref 1–4.8)
LYMPHOCYTES NFR BLD: 34.7 % (ref 22–41)
MCH RBC QN AUTO: 29.1 PG (ref 27–33)
MCHC RBC AUTO-ENTMCNC: 33.2 G/DL (ref 32.2–35.5)
MCV RBC AUTO: 87.6 FL (ref 81.4–97.8)
MICROALBUMIN UR-MCNC: <1.2 MG/DL
MICROALBUMIN/CREAT UR: NORMAL MG/G (ref 0–30)
MONOCYTES # BLD AUTO: 0.39 K/UL (ref 0–0.85)
MONOCYTES NFR BLD AUTO: 7.3 % (ref 0–13.4)
NEUTROPHILS # BLD AUTO: 2.87 K/UL (ref 1.82–7.42)
NEUTROPHILS NFR BLD: 53.5 % (ref 44–72)
NRBC # BLD AUTO: 0 K/UL
NRBC BLD-RTO: 0 /100 WBC (ref 0–0.2)
PLATELET # BLD AUTO: 229 K/UL (ref 164–446)
PMV BLD AUTO: 11.5 FL (ref 9–12.9)
POTASSIUM SERPL-SCNC: 4.5 MMOL/L (ref 3.6–5.5)
PROT SERPL-MCNC: 6.8 G/DL (ref 6–8.2)
RBC # BLD AUTO: 4.99 M/UL (ref 4.2–5.4)
SODIUM SERPL-SCNC: 139 MMOL/L (ref 135–145)
TRIGL SERPL-MCNC: 165 MG/DL (ref 0–149)
WBC # BLD AUTO: 5.4 K/UL (ref 4.8–10.8)

## 2024-06-05 PROCEDURE — 36415 COLL VENOUS BLD VENIPUNCTURE: CPT

## 2024-06-05 PROCEDURE — 80061 LIPID PANEL: CPT

## 2024-06-05 PROCEDURE — 85025 COMPLETE CBC W/AUTO DIFF WBC: CPT

## 2024-06-05 PROCEDURE — 82306 VITAMIN D 25 HYDROXY: CPT

## 2024-06-05 PROCEDURE — 82570 ASSAY OF URINE CREATININE: CPT

## 2024-06-05 PROCEDURE — 82043 UR ALBUMIN QUANTITATIVE: CPT

## 2024-06-05 PROCEDURE — 80053 COMPREHEN METABOLIC PANEL: CPT

## 2024-06-05 PROCEDURE — 83036 HEMOGLOBIN GLYCOSYLATED A1C: CPT

## 2024-08-14 ENCOUNTER — APPOINTMENT (RX ONLY)
Dept: URBAN - METROPOLITAN AREA CLINIC 4 | Facility: CLINIC | Age: 64
Setting detail: DERMATOLOGY
End: 2024-08-14

## 2024-08-14 DIAGNOSIS — L82.1 OTHER SEBORRHEIC KERATOSIS: ICD-10-CM

## 2024-08-14 DIAGNOSIS — Z71.89 OTHER SPECIFIED COUNSELING: ICD-10-CM

## 2024-08-14 DIAGNOSIS — L57.0 ACTINIC KERATOSIS: ICD-10-CM

## 2024-08-14 PROCEDURE — ? BENIGN DESTRUCTION COSMETIC

## 2024-08-14 PROCEDURE — 17000 DESTRUCT PREMALG LESION: CPT

## 2024-08-14 PROCEDURE — ? LIQUID NITROGEN

## 2024-08-14 PROCEDURE — ? SUNSCREEN RECOMMENDATIONS

## 2024-08-14 PROCEDURE — ? COUNSELING

## 2024-08-14 PROCEDURE — 99202 OFFICE O/P NEW SF 15 MIN: CPT | Mod: 25

## 2024-08-14 ASSESSMENT — LOCATION ZONE DERM
LOCATION ZONE: FACE
LOCATION ZONE: ARM
LOCATION ZONE: TRUNK
LOCATION ZONE: LIP

## 2024-08-14 ASSESSMENT — LOCATION DETAILED DESCRIPTION DERM
LOCATION DETAILED: RIGHT MEDIAL SUPERIOR CHEST
LOCATION DETAILED: RIGHT INFERIOR MEDIAL MALAR CHEEK
LOCATION DETAILED: RIGHT INFERIOR CENTRAL MALAR CHEEK
LOCATION DETAILED: RIGHT UPPER CUTANEOUS LIP
LOCATION DETAILED: LEFT ANTERIOR SHOULDER

## 2024-08-14 ASSESSMENT — LOCATION SIMPLE DESCRIPTION DERM
LOCATION SIMPLE: RIGHT CHEEK
LOCATION SIMPLE: CHEST
LOCATION SIMPLE: RIGHT LIP
LOCATION SIMPLE: LEFT SHOULDER

## 2024-08-14 NOTE — PROCEDURE: MIPS QUALITY
Constitutional: Awake and alert, in NAD  Eyes: clear bilaterally  Cardiac: S1S2, RR, no murmur  Resp: CTA/BL, no use of accessory muscles  GI: +BS x 4, soft, NTTP  ENT:  left ear canal swollen, partial blocked, unable to visualize TM, + tragus tenderness
Quality 130: Documentation Of Current Medications In The Medical Record: Current Medications Documented
Quality 226: Preventive Care And Screening: Tobacco Use: Screening And Cessation Intervention: Patient screened for tobacco use and is an ex/non-smoker
Quality 431: Preventive Care And Screening: Unhealthy Alcohol Use - Screening: Patient not identified as an unhealthy alcohol user when screened for unhealthy alcohol use using a systematic screening method
Detail Level: Simple
Constitutional: Awake and alert, in NAD  Eyes: clear bilaterally  Cardiac: S1S2, RR, no murmur  Resp: CTA/BL, no use of accessory muscles  GI: +BS x 4, soft, NTTP  ENT:  left ear canal swollen, partial blocked, unable to visualize TM, + tragus tenderness.

## 2024-08-14 NOTE — PROCEDURE: BENIGN DESTRUCTION COSMETIC
Price (Use Numbers Only, No Special Characters Or $): 80
Detail Level: Detailed
Anesthesia Volume In Cc: 0.5
Consent: The patient's consent was obtained including but not limited to risks of crusting, scabbing, blistering, scarring, darker or lighter pigmentary change, recurrence, incomplete removal and infection.
Post-Care Instructions: I reviewed with the patient in detail post-care instructions. Patient is to wear sunprotection, and avoid picking at any of the treated lesions. Pt may apply Vaseline to crusted or scabbing areas.

## 2024-08-14 NOTE — PROCEDURE: LIQUID NITROGEN
Application Tool (Optional): Liquid Nitrogen Sprayer
Number Of Freeze-Thaw Cycles: 1 freeze-thaw cycle
Render Post-Care Instructions In Note?: no
Consent: The patient's consent was obtained including but not limited to risks of crusting, scabbing, blistering, scarring, darker or lighter pigmentary change, recurrence, incomplete removal and infection.
Show Aperture Variable?: Yes
Detail Level: Detailed
Duration Of Freeze Thaw-Cycle (Seconds): 4
Post-Care Instructions: I reviewed with the patient in detail post-care instructions. Patient is to wear sunprotection, and avoid picking at any of the treated lesions. Pt may apply Vaseline to crusted or scabbing areas.

## 2025-04-22 NOTE — TELEPHONE ENCOUNTER
04/22/25                            Ann Maguire  [address] [facility] [address]    To Whom It May Concern:    This is to certify Ann Maguire was evaluated with MARTHA Lyons on 04/22/25 and can return to school on 04/23/2025.      Electronically signed by:  MARTHA Lyons  [facility] [address]  [address] [facility] [address]  Dept Phone: [phone]        Received request via: Pharmacy    Was the patient seen in the last year in this department? Yes    Does the patient have an active prescription (recently filled or refills available) for medication(s) requested? No